# Patient Record
Sex: MALE | Race: BLACK OR AFRICAN AMERICAN | NOT HISPANIC OR LATINO | Employment: FULL TIME | URBAN - METROPOLITAN AREA
[De-identification: names, ages, dates, MRNs, and addresses within clinical notes are randomized per-mention and may not be internally consistent; named-entity substitution may affect disease eponyms.]

---

## 2020-09-20 ENCOUNTER — HOSPITAL ENCOUNTER (EMERGENCY)
Facility: HOSPITAL | Age: 25
Discharge: HOME | End: 2020-09-20
Attending: EMERGENCY MEDICINE
Payer: MEDICAID

## 2020-09-20 VITALS
WEIGHT: 150 LBS | OXYGEN SATURATION: 97 % | HEART RATE: 116 BPM | BODY MASS INDEX: 21.47 KG/M2 | DIASTOLIC BLOOD PRESSURE: 74 MMHG | HEIGHT: 70 IN | RESPIRATION RATE: 20 BRPM | SYSTOLIC BLOOD PRESSURE: 146 MMHG | TEMPERATURE: 98 F

## 2020-09-20 DIAGNOSIS — J45.901 MODERATE ASTHMA WITH EXACERBATION, UNSPECIFIED WHETHER PERSISTENT: Primary | ICD-10-CM

## 2020-09-20 DIAGNOSIS — R06.02 SOB (SHORTNESS OF BREATH): ICD-10-CM

## 2020-09-20 LAB
ALBUMIN SERPL BCP-MCNC: 4.3 G/DL (ref 3.5–5.2)
ALP SERPL-CCNC: 53 U/L (ref 55–135)
ALT SERPL W/O P-5'-P-CCNC: 13 U/L (ref 10–44)
ANION GAP SERPL CALC-SCNC: 14 MMOL/L (ref 8–16)
AST SERPL-CCNC: 31 U/L (ref 10–40)
BASOPHILS # BLD AUTO: 0.08 K/UL (ref 0–0.2)
BASOPHILS NFR BLD: 0.7 % (ref 0–1.9)
BILIRUB SERPL-MCNC: 0.5 MG/DL (ref 0.1–1)
BUN SERPL-MCNC: 12 MG/DL (ref 6–20)
CALCIUM SERPL-MCNC: 9.4 MG/DL (ref 8.7–10.5)
CHLORIDE SERPL-SCNC: 104 MMOL/L (ref 95–110)
CO2 SERPL-SCNC: 21 MMOL/L (ref 23–29)
CREAT SERPL-MCNC: 1 MG/DL (ref 0.5–1.4)
DIFFERENTIAL METHOD: ABNORMAL
EOSINOPHIL # BLD AUTO: 1.5 K/UL (ref 0–0.5)
EOSINOPHIL NFR BLD: 13.7 % (ref 0–8)
ERYTHROCYTE [DISTWIDTH] IN BLOOD BY AUTOMATED COUNT: 12.6 % (ref 11.5–14.5)
EST. GFR  (AFRICAN AMERICAN): >60 ML/MIN/1.73 M^2
EST. GFR  (NON AFRICAN AMERICAN): >60 ML/MIN/1.73 M^2
GLUCOSE SERPL-MCNC: 84 MG/DL (ref 70–110)
HCT VFR BLD AUTO: 42.5 % (ref 40–54)
HGB BLD-MCNC: 13.8 G/DL (ref 14–18)
IMM GRANULOCYTES # BLD AUTO: 0.04 K/UL (ref 0–0.04)
IMM GRANULOCYTES NFR BLD AUTO: 0.4 % (ref 0–0.5)
LYMPHOCYTES # BLD AUTO: 2 K/UL (ref 1–4.8)
LYMPHOCYTES NFR BLD: 18.8 % (ref 18–48)
MCH RBC QN AUTO: 29.3 PG (ref 27–31)
MCHC RBC AUTO-ENTMCNC: 32.5 G/DL (ref 32–36)
MCV RBC AUTO: 90 FL (ref 82–98)
MONOCYTES # BLD AUTO: 0.6 K/UL (ref 0.3–1)
MONOCYTES NFR BLD: 5.6 % (ref 4–15)
NEUTROPHILS # BLD AUTO: 6.6 K/UL (ref 1.8–7.7)
NEUTROPHILS NFR BLD: 60.8 % (ref 38–73)
NRBC BLD-RTO: 0 /100 WBC
PLATELET # BLD AUTO: 157 K/UL (ref 150–350)
PMV BLD AUTO: 12.3 FL (ref 9.2–12.9)
POTASSIUM SERPL-SCNC: 3.8 MMOL/L (ref 3.5–5.1)
PROT SERPL-MCNC: 8.1 G/DL (ref 6–8.4)
RBC # BLD AUTO: 4.71 M/UL (ref 4.6–6.2)
SARS-COV-2 RDRP RESP QL NAA+PROBE: NEGATIVE
SODIUM SERPL-SCNC: 139 MMOL/L (ref 136–145)
WBC # BLD AUTO: 10.88 K/UL (ref 3.9–12.7)

## 2020-09-20 PROCEDURE — 99900035 HC TECH TIME PER 15 MIN (STAT)

## 2020-09-20 PROCEDURE — 93010 EKG 12-LEAD: ICD-10-PCS | Mod: ,,, | Performed by: INTERNAL MEDICINE

## 2020-09-20 PROCEDURE — 25000003 PHARM REV CODE 250: Performed by: NURSE PRACTITIONER

## 2020-09-20 PROCEDURE — 99285 PR EMERGENCY DEPT VISIT,LEVEL V: ICD-10-PCS | Mod: ,,, | Performed by: NURSE PRACTITIONER

## 2020-09-20 PROCEDURE — 27000221 HC OXYGEN, UP TO 24 HOURS

## 2020-09-20 PROCEDURE — 94761 N-INVAS EAR/PLS OXIMETRY MLT: CPT

## 2020-09-20 PROCEDURE — U0002 COVID-19 LAB TEST NON-CDC: HCPCS

## 2020-09-20 PROCEDURE — 96360 HYDRATION IV INFUSION INIT: CPT

## 2020-09-20 PROCEDURE — 99285 EMERGENCY DEPT VISIT HI MDM: CPT | Mod: ,,, | Performed by: NURSE PRACTITIONER

## 2020-09-20 PROCEDURE — 99285 EMERGENCY DEPT VISIT HI MDM: CPT | Mod: 25

## 2020-09-20 PROCEDURE — 93010 ELECTROCARDIOGRAM REPORT: CPT | Mod: ,,, | Performed by: INTERNAL MEDICINE

## 2020-09-20 PROCEDURE — 25000242 PHARM REV CODE 250 ALT 637 W/ HCPCS: Performed by: NURSE PRACTITIONER

## 2020-09-20 PROCEDURE — 93005 ELECTROCARDIOGRAM TRACING: CPT

## 2020-09-20 PROCEDURE — 80053 COMPREHEN METABOLIC PANEL: CPT

## 2020-09-20 PROCEDURE — 94640 AIRWAY INHALATION TREATMENT: CPT

## 2020-09-20 PROCEDURE — 85025 COMPLETE CBC W/AUTO DIFF WBC: CPT

## 2020-09-20 RX ORDER — ALBUTEROL SULFATE 90 UG/1
1-2 AEROSOL, METERED RESPIRATORY (INHALATION) EVERY 6 HOURS PRN
Qty: 18 G | Refills: 2 | Status: ON HOLD | OUTPATIENT
Start: 2020-09-20 | End: 2020-11-01 | Stop reason: HOSPADM

## 2020-09-20 RX ORDER — ALBUTEROL SULFATE 90 UG/1
1-2 AEROSOL, METERED RESPIRATORY (INHALATION) EVERY 6 HOURS PRN
Qty: 18 G | Refills: 2 | OUTPATIENT
Start: 2020-09-20 | End: 2020-09-20 | Stop reason: SDUPTHER

## 2020-09-20 RX ORDER — PREDNISONE 50 MG/1
50 TABLET ORAL DAILY
Qty: 5 TABLET | Refills: 0 | Status: SHIPPED | OUTPATIENT
Start: 2020-09-20 | End: 2020-09-25

## 2020-09-20 RX ORDER — IPRATROPIUM BROMIDE AND ALBUTEROL SULFATE 2.5; .5 MG/3ML; MG/3ML
3 SOLUTION RESPIRATORY (INHALATION)
Status: COMPLETED | OUTPATIENT
Start: 2020-09-20 | End: 2020-09-20

## 2020-09-20 RX ORDER — PREDNISONE 50 MG/1
50 TABLET ORAL DAILY
Qty: 5 TABLET | Refills: 0 | OUTPATIENT
Start: 2020-09-20 | End: 2020-09-20 | Stop reason: SDUPTHER

## 2020-09-20 RX ADMIN — IPRATROPIUM BROMIDE AND ALBUTEROL SULFATE 3 ML: .5; 2.5 SOLUTION RESPIRATORY (INHALATION) at 03:09

## 2020-09-20 RX ADMIN — SODIUM CHLORIDE 1000 ML: 0.9 INJECTION, SOLUTION INTRAVENOUS at 03:09

## 2020-09-20 NOTE — ED TRIAGE NOTES
Pt coming in for SOB and chest pain. Hx of asthma, stated he has been having more asthma attacks this week and has ran out of his inhaler. Pt barely able to talk due to SOB. Was given 125 of solumedrol and albuterol by EMS prior to arrival    LOC: The patient is awake, alert, and oriented to place, time, situation. Affect is appropriate.  Speech is appropriate and clear.     APPEARANCE: Pt shaking and having trouble breathing. Patient is clean and well groomed.    SKIN: The skin is warm and dry; color consistent with ethnicity.  Patient has normal skin turgor and moist mucus membranes.  Skin intact; no breakdown or bruising noted.     MUSCULOSKELETAL: Patient moving upper and lower extremities without difficulty.  Denies weakness.     RESPIRATORY: Airway is open and patent. Respirations spontaneous, even and labored. SOB and wheezing noted. . Accessory muscle use noted. Denies cough.     CARDIAC:  Sinus tachycardia.  No peripheral edema noted. Complaints of chest pain.      ABDOMEN: Soft and non tender to palpation.  No distention noted.     NEUROLOGIC: Eyes open spontaneously.  Behavior appropriate to situation.  Follows commands; facial expression symmetrical.  Purposeful motor response noted; normal sensation in all extremities.

## 2020-09-20 NOTE — ED PROVIDER NOTES
Encounter Date: 9/20/2020       History     Chief Complaint   Patient presents with    Shortness of Breath     arrived via  EMS with c/o SOB, hx of asthma, out of inhaler, reports more frequent asthma attacks x1 week, initial SpO2 80% on RA, wheezing, speaking short sentences, improved now, c/o continued CP and SOB, still speaking short sentences, received albuteral atrovent and 125mg solumedrol by ems     Patient is a 25-year-old male with medical history of asthma presenting to the ED for increased shortness of breath and wheezing for the past few days.  Patient states he recently ran out of his inhalers.  Patient states this afternoon he had increased wheezing and could only speak in 1 word sentences.  Patient activated EMS and on arrival patient's oxygen saturations at or 80%.  Patient was given inhalers and Solu-Medrol and route to the ED. Patient is able to speak in 3 to 4 word sentences.  Patient denies any recent fever, chills or sick contacts.    The history is provided by the patient.     Review of patient's allergies indicates:  No Known Allergies  Past Medical History:   Diagnosis Date    Asthma      History reviewed. No pertinent surgical history.  History reviewed. No pertinent family history.  Social History     Tobacco Use    Smoking status: Never Smoker   Substance Use Topics    Alcohol use: Not on file    Drug use: Not on file     Review of Systems   Constitutional: Negative for activity change, appetite change, chills, fatigue and fever.   HENT: Negative for congestion and sore throat.    Eyes: Negative for photophobia and visual disturbance.   Respiratory: Positive for cough, chest tightness, shortness of breath and wheezing.    Cardiovascular: Negative for chest pain, palpitations and leg swelling.   Gastrointestinal: Negative for abdominal pain, constipation, diarrhea, nausea and vomiting.   Genitourinary: Negative for dysuria.   Musculoskeletal: Negative for arthralgias, back pain and  myalgias.   Skin: Negative for rash.   Allergic/Immunologic: Negative for immunocompromised state.   Neurological: Negative for dizziness, weakness, numbness and headaches.   Hematological: Does not bruise/bleed easily.   All other systems reviewed and are negative.      Physical Exam     Initial Vitals [09/20/20 1501]   BP Pulse Resp Temp SpO2   (!) 130/91 (!) 115 (!) 23 98.4 °F (36.9 °C) 99 %      MAP       --         Physical Exam    Nursing note and vitals reviewed.  Constitutional: Vital signs are normal. He appears well-developed and well-nourished. He is cooperative. No distress.   HENT:   Head: Normocephalic and atraumatic.   Mouth/Throat: Mucous membranes are normal.   Eyes: EOM and lids are normal. Pupils are equal, round, and reactive to light.   Neck: Trachea normal, normal range of motion and phonation normal. Neck supple.   Cardiovascular: Normal rate, regular rhythm and intact distal pulses.   Pulses:       Radial pulses are 2+ on the right side and 2+ on the left side.   Pulmonary/Chest: Effort normal. He has decreased breath sounds. He has wheezes in the right lower field and the left lower field. He has no rhonchi. He has no rales.   Abdominal: Normal appearance.   Musculoskeletal: Normal range of motion.   Neurological: He is alert and oriented to person, place, and time. He has normal strength. No sensory deficit. He displays a negative Romberg sign. GCS eye subscore is 4. GCS verbal subscore is 5. GCS motor subscore is 6.   Skin: Skin is warm, dry and intact. Capillary refill takes 2 to 3 seconds. No abrasion, no laceration and no rash noted. No cyanosis. Nails show no clubbing.         ED Course   Procedures  Labs Reviewed   CBC W/ AUTO DIFFERENTIAL - Abnormal; Notable for the following components:       Result Value    Hemoglobin 13.8 (*)     Eos # 1.5 (*)     Eosinophil% 13.7 (*)     All other components within normal limits   COMPREHENSIVE METABOLIC PANEL - Abnormal; Notable for the  following components:    CO2 21 (*)     Alkaline Phosphatase 53 (*)     All other components within normal limits   SARS-COV-2 RNA AMPLIFICATION, QUAL     EKG Readings: (Independently Interpreted)   Rhythm: Junctional Tachycardia. Heart Rate: 123.   My interpretation       Imaging Results          X-Ray Chest AP Portable (Final result)  Result time 09/20/20 15:45:10    Final result by Sharon Allen MD (09/20/20 15:45:10)                 Impression:      No acute abnormality.      Electronically signed by: Sharon Allen MD  Date:    09/20/2020  Time:    15:45             Narrative:    EXAMINATION:  XR CHEST AP PORTABLE    CLINICAL HISTORY:  Shortness of breath    TECHNIQUE:  Single frontal view of the chest was performed.    COMPARISON:  None    FINDINGS:  The lungs are clear, with normal appearance of pulmonary vasculature and no large pleural effusion or pneumothorax.    The cardiac silhouette is normal in size. The hilar and mediastinal contours are unremarkable.    Bones are intact.                              X-Rays:   Independently Interpreted Readings:   Other Readings:  Reviewed by me, read by Radiology    Medical Decision Making:   Initial Assessment:   Emergent evaluation of a 25-year-old male presenting to the ED for increased shortness of breath and wheezing.  Patient recently ran out of his albuterol inhaler.  On exam patient is A&O x3.  Not febrile and nontoxic appearing.  Breath sounds diminished with wheezes noted in the bases on exam.  No tachypnea.  Patient is speaking in full sentences.  Abdomen soft and nontender.  Bowel sounds within normal limits.  Strength 5/5 in all extremities.  Differential Diagnosis:   Differential diagnoses include but are not limited to asthma exacerbation, COVID, viral URI, pneumonia, bronchitis.      Clinical Tests:   Lab Tests: Ordered and Reviewed  The following lab test(s) were unremarkable: CBC and CMP  Radiological Study: Ordered and Reviewed  Medical  Tests: Reviewed and Ordered  ED Management:  I will get basic labs, imaging, duo nebs and reassess.  I discussed this case with my supervising physician.                   ED Course as of Sep 20 1630   Sun Sep 20, 2020   1600 Patient's CBC unremarkable.  No leukocytosis noted.  H&H stable.  CMP unremarkable year COVID negative.  Chest x-ray negative for any acute abnormalities.  Patient reassessed patient no longer wheezing.  Patient is speaking in full sentences.  Patient updated on lab and imaging results.  Advised we will give prednisone for 5 days.  Refilled albuterol inhaler.  Advised increase fluids and rest.  Patient verbalized understanding of this plan of care.  All questions and concerns addressed.    [RZ]   1615 Patient is hemodynamically stable, vital signs are normal. Discharge instructions given. Return to ED precautions discussed. Follow up as directed. Pt verbalized understanding of this plan.  Pt is stable for discharge.     [RZ]      ED Course User Index  [RZ] Vandana Leal NP            Clinical Impression:       ICD-10-CM ICD-9-CM   1. Moderate asthma with exacerbation, unspecified whether persistent  J45.901 493.92   2. SOB (shortness of breath)  R06.02 786.05                      Disposition:   Disposition: Discharged  Condition: Stable     ED Disposition Condition    Discharge Stable        ED Prescriptions     Medication Sig Dispense Start Date End Date Auth. Provider    albuterol (PROVENTIL/VENTOLIN HFA) 90 mcg/actuation inhaler  (Status: Discontinued) Inhale 1-2 puffs into the lungs every 6 (six) hours as needed for Wheezing. Rescue 18 g 9/20/2020 9/20/2020 Vandana Leal NP    predniSONE (DELTASONE) 50 MG Tab  (Status: Discontinued) Take 1 tablet (50 mg total) by mouth once daily. for 5 days 5 tablet 9/20/2020 9/20/2020 Vandana Leal NP    albuterol (PROVENTIL/VENTOLIN HFA) 90 mcg/actuation inhaler  (Status: Discontinued) Inhale 1-2 puffs into the lungs every 6 (six) hours as  needed for Wheezing. Rescue 18 g 9/20/2020 9/20/2020 Vandana Leal NP    albuterol (PROVENTIL/VENTOLIN HFA) 90 mcg/actuation inhaler Inhale 1-2 puffs into the lungs every 6 (six) hours as needed for Wheezing. Rescue 18 g 9/20/2020 9/20/2021 Radha Pascal MD    predniSONE (DELTASONE) 50 MG Tab Take 1 tablet (50 mg total) by mouth once daily. for 5 days 5 tablet 9/20/2020 9/25/2020 Radha Pascal MD        Follow-up Information     Follow up With Specialties Details Why Contact Info    [facility], Family Medicine Schedule an appointment as soon as possible for a visit  As needed [address] [facility] [address] [facility] [address]  [phone]      [facility]  Schedule an appointment as soon as possible for a visit  As needed [address] [facility] [address]  [phone]                                         Vandana Leal NP  09/20/20 8463

## 2020-09-20 NOTE — DISCHARGE INSTRUCTIONS
We have prescribed you albuterol and prednisone for your asthma exacerbation.  Please increase fluids and rest today.  Follow-up with your PCP as needed.

## 2020-10-31 ENCOUNTER — HOSPITAL ENCOUNTER (OUTPATIENT)
Facility: HOSPITAL | Age: 25
Discharge: HOME | DRG: 202 | End: 2020-11-01
Attending: EMERGENCY MEDICINE | Admitting: EMERGENCY MEDICINE
Payer: MEDICAID

## 2020-10-31 DIAGNOSIS — J45.21 MILD INTERMITTENT ASTHMA WITH EXACERBATION: Primary | ICD-10-CM

## 2020-10-31 PROBLEM — E87.6 HYPOKALEMIA: Status: ACTIVE | Noted: 2020-10-31

## 2020-10-31 PROBLEM — Z75.8 DISCHARGE PLANNING ISSUES: Status: ACTIVE | Noted: 2020-10-31

## 2020-10-31 PROBLEM — J45.31 MILD PERSISTENT ASTHMA WITH ACUTE EXACERBATION: Status: ACTIVE | Noted: 2020-10-31

## 2020-10-31 PROBLEM — E87.29 HIGH ANION GAP METABOLIC ACIDOSIS: Status: ACTIVE | Noted: 2020-10-31

## 2020-10-31 LAB
ALBUMIN SERPL BCP-MCNC: 4.3 G/DL (ref 3.5–5.2)
ALP SERPL-CCNC: 50 U/L (ref 55–135)
ALT SERPL W/O P-5'-P-CCNC: 13 U/L (ref 10–44)
ANION GAP SERPL CALC-SCNC: 14 MMOL/L (ref 8–16)
AST SERPL-CCNC: 19 U/L (ref 10–40)
BASOPHILS # BLD AUTO: 0.06 K/UL (ref 0–0.2)
BASOPHILS NFR BLD: 0.4 % (ref 0–1.9)
BILIRUB SERPL-MCNC: 0.7 MG/DL (ref 0.1–1)
BUN SERPL-MCNC: 13 MG/DL (ref 6–20)
CALCIUM SERPL-MCNC: 9.1 MG/DL (ref 8.7–10.5)
CHLORIDE SERPL-SCNC: 99 MMOL/L (ref 95–110)
CO2 SERPL-SCNC: 23 MMOL/L (ref 23–29)
CREAT SERPL-MCNC: 1 MG/DL (ref 0.5–1.4)
CTP QC/QA: YES
DIFFERENTIAL METHOD: ABNORMAL
EOSINOPHIL # BLD AUTO: 0.3 K/UL (ref 0–0.5)
EOSINOPHIL NFR BLD: 1.9 % (ref 0–8)
ERYTHROCYTE [DISTWIDTH] IN BLOOD BY AUTOMATED COUNT: 13 % (ref 11.5–14.5)
EST. GFR  (AFRICAN AMERICAN): >60 ML/MIN/1.73 M^2
EST. GFR  (NON AFRICAN AMERICAN): >60 ML/MIN/1.73 M^2
GLUCOSE SERPL-MCNC: 100 MG/DL (ref 70–110)
HCT VFR BLD AUTO: 42 % (ref 40–54)
HGB BLD-MCNC: 13.8 G/DL (ref 14–18)
IMM GRANULOCYTES # BLD AUTO: 0.05 K/UL (ref 0–0.04)
IMM GRANULOCYTES NFR BLD AUTO: 0.4 % (ref 0–0.5)
LACTATE SERPL-SCNC: 3.3 MMOL/L (ref 0.5–2.2)
LYMPHOCYTES # BLD AUTO: 1.3 K/UL (ref 1–4.8)
LYMPHOCYTES NFR BLD: 9.4 % (ref 18–48)
MAGNESIUM SERPL-MCNC: 1.9 MG/DL (ref 1.6–2.6)
MCH RBC QN AUTO: 29.6 PG (ref 27–31)
MCHC RBC AUTO-ENTMCNC: 32.9 G/DL (ref 32–36)
MCV RBC AUTO: 90 FL (ref 82–98)
MONOCYTES # BLD AUTO: 0.5 K/UL (ref 0.3–1)
MONOCYTES NFR BLD: 3.8 % (ref 4–15)
NEUTROPHILS # BLD AUTO: 11.5 K/UL (ref 1.8–7.7)
NEUTROPHILS NFR BLD: 84.1 % (ref 38–73)
NRBC BLD-RTO: 0 /100 WBC
PHOSPHATE SERPL-MCNC: 3.2 MG/DL (ref 2.7–4.5)
PLATELET # BLD AUTO: 215 K/UL (ref 150–350)
PMV BLD AUTO: 10.9 FL (ref 9.2–12.9)
POTASSIUM SERPL-SCNC: 3.3 MMOL/L (ref 3.5–5.1)
PROT SERPL-MCNC: 7.4 G/DL (ref 6–8.4)
RBC # BLD AUTO: 4.66 M/UL (ref 4.6–6.2)
SARS-COV-2 RDRP RESP QL NAA+PROBE: NEGATIVE
SODIUM SERPL-SCNC: 136 MMOL/L (ref 136–145)
WBC # BLD AUTO: 13.67 K/UL (ref 3.9–12.7)

## 2020-10-31 PROCEDURE — 99223 PR INITIAL HOSPITAL CARE,LEVL III: ICD-10-PCS | Mod: ,,, | Performed by: STUDENT IN AN ORGANIZED HEALTH CARE EDUCATION/TRAINING PROGRAM

## 2020-10-31 PROCEDURE — 27000221 HC OXYGEN, UP TO 24 HOURS

## 2020-10-31 PROCEDURE — U0002 COVID-19 LAB TEST NON-CDC: HCPCS | Performed by: STUDENT IN AN ORGANIZED HEALTH CARE EDUCATION/TRAINING PROGRAM

## 2020-10-31 PROCEDURE — 96361 HYDRATE IV INFUSION ADD-ON: CPT

## 2020-10-31 PROCEDURE — 99291 CRITICAL CARE FIRST HOUR: CPT

## 2020-10-31 PROCEDURE — 84100 ASSAY OF PHOSPHORUS: CPT

## 2020-10-31 PROCEDURE — 25000242 PHARM REV CODE 250 ALT 637 W/ HCPCS: Performed by: STUDENT IN AN ORGANIZED HEALTH CARE EDUCATION/TRAINING PROGRAM

## 2020-10-31 PROCEDURE — G0378 HOSPITAL OBSERVATION PER HR: HCPCS

## 2020-10-31 PROCEDURE — 99291 CRITICAL CARE FIRST HOUR: CPT | Mod: ,,, | Performed by: EMERGENCY MEDICINE

## 2020-10-31 PROCEDURE — 63600175 PHARM REV CODE 636 W HCPCS: Performed by: STUDENT IN AN ORGANIZED HEALTH CARE EDUCATION/TRAINING PROGRAM

## 2020-10-31 PROCEDURE — 80053 COMPREHEN METABOLIC PANEL: CPT

## 2020-10-31 PROCEDURE — 94640 AIRWAY INHALATION TREATMENT: CPT

## 2020-10-31 PROCEDURE — 83605 ASSAY OF LACTIC ACID: CPT

## 2020-10-31 PROCEDURE — 83735 ASSAY OF MAGNESIUM: CPT

## 2020-10-31 PROCEDURE — 99223 1ST HOSP IP/OBS HIGH 75: CPT | Mod: ,,, | Performed by: STUDENT IN AN ORGANIZED HEALTH CARE EDUCATION/TRAINING PROGRAM

## 2020-10-31 PROCEDURE — 85025 COMPLETE CBC W/AUTO DIFF WBC: CPT

## 2020-10-31 PROCEDURE — 25000003 PHARM REV CODE 250: Performed by: EMERGENCY MEDICINE

## 2020-10-31 PROCEDURE — 94660 CPAP INITIATION&MGMT: CPT

## 2020-10-31 PROCEDURE — 27000190 HC CPAP FULL FACE MASK W/VALVE

## 2020-10-31 PROCEDURE — 99291 PR CRITICAL CARE, E/M 30-74 MINUTES: ICD-10-PCS | Mod: ,,, | Performed by: EMERGENCY MEDICINE

## 2020-10-31 PROCEDURE — 99900035 HC TECH TIME PER 15 MIN (STAT)

## 2020-10-31 PROCEDURE — 99285 EMERGENCY DEPT VISIT HI MDM: CPT | Mod: 25

## 2020-10-31 PROCEDURE — 94761 N-INVAS EAR/PLS OXIMETRY MLT: CPT

## 2020-10-31 PROCEDURE — 11000001 HC ACUTE MED/SURG PRIVATE ROOM

## 2020-10-31 PROCEDURE — 25000242 PHARM REV CODE 250 ALT 637 W/ HCPCS

## 2020-10-31 RX ORDER — IBUPROFEN 200 MG
24 TABLET ORAL
Status: DISCONTINUED | OUTPATIENT
Start: 2020-10-31 | End: 2020-11-01 | Stop reason: HOSPADM

## 2020-10-31 RX ORDER — ALBUTEROL SULFATE 90 UG/1
2 AEROSOL, METERED RESPIRATORY (INHALATION) EVERY 4 HOURS PRN
Status: DISCONTINUED | OUTPATIENT
Start: 2020-10-31 | End: 2020-11-01 | Stop reason: HOSPADM

## 2020-10-31 RX ORDER — SODIUM CHLORIDE 0.9 % (FLUSH) 0.9 %
10 SYRINGE (ML) INJECTION
Status: DISCONTINUED | OUTPATIENT
Start: 2020-10-31 | End: 2020-11-01 | Stop reason: HOSPADM

## 2020-10-31 RX ORDER — ALBUTEROL SULFATE 2.5 MG/.5ML
2.5 SOLUTION RESPIRATORY (INHALATION)
Status: DISCONTINUED | OUTPATIENT
Start: 2020-10-31 | End: 2020-11-01 | Stop reason: HOSPADM

## 2020-10-31 RX ORDER — IBUPROFEN 200 MG
16 TABLET ORAL
Status: DISCONTINUED | OUTPATIENT
Start: 2020-10-31 | End: 2020-11-01 | Stop reason: HOSPADM

## 2020-10-31 RX ORDER — FLUTICASONE FUROATE AND VILANTEROL 100; 25 UG/1; UG/1
1 POWDER RESPIRATORY (INHALATION) DAILY
Status: DISCONTINUED | OUTPATIENT
Start: 2020-10-31 | End: 2020-11-01 | Stop reason: HOSPADM

## 2020-10-31 RX ORDER — POLYETHYLENE GLYCOL 3350 17 G/17G
17 POWDER, FOR SOLUTION ORAL DAILY PRN
Status: DISCONTINUED | OUTPATIENT
Start: 2020-10-31 | End: 2020-11-01 | Stop reason: HOSPADM

## 2020-10-31 RX ORDER — GLUCAGON 1 MG
1 KIT INJECTION
Status: DISCONTINUED | OUTPATIENT
Start: 2020-10-31 | End: 2020-11-01 | Stop reason: HOSPADM

## 2020-10-31 RX ORDER — ONDANSETRON 2 MG/ML
4 INJECTION INTRAMUSCULAR; INTRAVENOUS EVERY 8 HOURS PRN
Status: DISCONTINUED | OUTPATIENT
Start: 2020-10-31 | End: 2020-11-01 | Stop reason: HOSPADM

## 2020-10-31 RX ORDER — TALC
6 POWDER (GRAM) TOPICAL NIGHTLY PRN
Status: DISCONTINUED | OUTPATIENT
Start: 2020-10-31 | End: 2020-11-01 | Stop reason: HOSPADM

## 2020-10-31 RX ORDER — PREDNISONE 10 MG/1
40 TABLET ORAL DAILY
Status: DISCONTINUED | OUTPATIENT
Start: 2020-10-31 | End: 2020-11-01 | Stop reason: HOSPADM

## 2020-10-31 RX ORDER — ACETAMINOPHEN 500 MG
1000 TABLET ORAL EVERY 8 HOURS PRN
Status: DISCONTINUED | OUTPATIENT
Start: 2020-10-31 | End: 2020-11-01 | Stop reason: HOSPADM

## 2020-10-31 RX ORDER — ALBUTEROL SULFATE 2.5 MG/.5ML
2.5 SOLUTION RESPIRATORY (INHALATION)
Status: COMPLETED | OUTPATIENT
Start: 2020-10-31 | End: 2020-10-31

## 2020-10-31 RX ORDER — ALBUTEROL SULFATE 2.5 MG/.5ML
SOLUTION RESPIRATORY (INHALATION)
Status: COMPLETED
Start: 2020-10-31 | End: 2020-10-31

## 2020-10-31 RX ADMIN — ALBUTEROL SULFATE 2.5 MG: 2.5 SOLUTION RESPIRATORY (INHALATION) at 08:10

## 2020-10-31 RX ADMIN — ALBUTEROL SULFATE 15 MG: 2.5 SOLUTION RESPIRATORY (INHALATION) at 05:10

## 2020-10-31 RX ADMIN — PREDNISONE 40 MG: 20 TABLET ORAL at 08:10

## 2020-10-31 RX ADMIN — FLUTICASONE FUROATE AND VILANTEROL TRIFENATATE 1 PUFF: 100; 25 POWDER RESPIRATORY (INHALATION) at 08:10

## 2020-10-31 RX ADMIN — SODIUM CHLORIDE 1000 ML: 0.9 INJECTION, SOLUTION INTRAVENOUS at 06:10

## 2020-10-31 NOTE — ED NOTES
Pt identifiers checked and accurate with Mona Whyte     Pt from work via EMS for asthma attack, original room air saturation in 70's, pt arrives to ED on CPAP, EMS administered albuterol treatment, solumedrol and 2 g magnesium. Pt hx of asthma, using inhaler at home more frequently recently with cough. Pt denies CP, fever, chills, abdominal pain.     LOC: The patient is awake, alert and aware of environment with an appropriate affect, the patient is oriented x 3 and speaking appropriately.  APPEARANCE: Patient resting comfortably and in no acute distress, patient is clean and well groomed  SKIN: The skin is warm and dry, color consistent with ethnicity, patient has normal skin turgor and moist mucus membranes, skin intact, no breakdown or brusing noted.  MUSCULOSKELETAL: Patient moving all extremities well, no obvious swelling or deformities noted.   RESPIRATORY: Airway is open and patent; respirations are spontaneous, patient has increased effort and rate of breathing, pt arrived to ED on CPAP, oxygen saturation 100%. Once removed from CPAP, pt oxygen saturation decreased to 82% on room air.   CARDIAC: Patient has no peripheral edema noted. No complaints of chest pain at this time.   ABDOMEN: Soft and non tender to palpation, no distention noted. Bowel sounds present x 4  NEUROLOGIC: eyes open spontaneously, behavior appropriate to situation, follows commands, facial expression symmetrical, purposeful motor response noted, normal sensation in all extremities when touched with a finger.        RT, ED MD at the bedside.

## 2020-10-31 NOTE — ED NOTES
Pt provided call bell and instructed to call for assistance as needed, pt repositioned and states no needs at this time. Will continue to monitor and update pt on plan of care.

## 2020-10-31 NOTE — ED PROVIDER NOTES
Encounter Date: 10/31/2020       History     Chief Complaint   Patient presents with    Asthma     26yo M with asthma presents on CPAP via EMS for shortness of breath. He says that his asthma is usually controlled and he only uses a rescue inhaler, though he was in the ED within the past month. He has never been intubated or required ICU for asthma. He states that his usual trigger is the weather this time of year. He received magnesium, albuterol via EMS prior to arrival.   EMS reports significant distress prior to arrival. They noted no air movement and an initial sat of 78%      The history is provided by the patient and the EMS personnel. The history is limited by the condition of the patient and the absence of a caregiver.     Review of patient's allergies indicates:   Allergen Reactions    Atrovent [ipratropium bromide]     Nuts [tree nut]     Shellfish containing products      Past Medical History:   Diagnosis Date    Asthma     Mild Persistent    Stutter      Past Surgical History:   Procedure Laterality Date    MANDIBLE SURGERY       Family History   Problem Relation Age of Onset    No Known Problems Mother     No Known Problems Father      Social History     Tobacco Use    Smoking status: Never Smoker   Substance Use Topics    Alcohol use: Not on file    Drug use: Not on file     Review of Systems   Unable to perform ROS: Severe respiratory distress       Physical Exam     Initial Vitals   BP Pulse Resp Temp SpO2   10/31/20 1727 10/31/20 1727 10/31/20 1727 10/31/20 1906 10/31/20 1727   126/78 (!) 120 (!) 34 98 °F (36.7 °C) 98 %      MAP       --                Physical Exam    Nursing note and vitals reviewed.  Constitutional: He appears distressed.   Patient in acute distress, with bipap on, only able to speak in 1-2 words phrases, no tripoding but accessory muscle use, alert   HENT:   Head: Normocephalic and atraumatic.   Mouth/Throat: Oropharynx is clear and moist.   Eyes: Conjunctivae are  normal. No scleral icterus.   Neck: Normal range of motion. Neck supple.   Cardiovascular: Regular rhythm, normal heart sounds and intact distal pulses.   Tachycardia   Pulmonary/Chest: He is in respiratory distress.   Decreased air movement in the lung bases, inspiratory and expiratory wheezes in mid and upper lung zones bilaterally. No rales. Patient with difficulty speaking 1-2 words   Abdominal: Soft. He exhibits no distension. There is no abdominal tenderness.   Musculoskeletal: No tenderness or edema.   Lymphadenopathy:     He has no cervical adenopathy.   Neurological: He is alert and oriented to person, place, and time.   Skin: Skin is warm and dry. Capillary refill takes less than 2 seconds. No rash noted.   Psychiatric: He has a normal mood and affect. Thought content normal.         ED Course   Procedures  Labs Reviewed   CBC W/ AUTO DIFFERENTIAL - Abnormal; Notable for the following components:       Result Value    WBC 13.67 (*)     Hemoglobin 13.8 (*)     Gran # (ANC) 11.5 (*)     Immature Grans (Abs) 0.05 (*)     Gran % 84.1 (*)     Lymph % 9.4 (*)     Mono % 3.8 (*)     All other components within normal limits   COMPREHENSIVE METABOLIC PANEL - Abnormal; Notable for the following components:    Potassium 3.3 (*)     Alkaline Phosphatase 50 (*)     All other components within normal limits   CULTURE, RESPIRATORY   MAGNESIUM   PHOSPHORUS   SARS-COV-2 RDRP GENE          Imaging Results          X-Ray Chest AP Portable (Final result)  Result time 10/31/20 18:44:54    Final result by Yosef Calvin MD (10/31/20 18:44:54)                 Impression:      1. No acute cardiopulmonary process.      Electronically signed by: Yosef Calvin MD  Date:    10/31/2020  Time:    18:44             Narrative:    EXAMINATION:  XR CHEST AP PORTABLE    CLINICAL HISTORY:  Asthma;    TECHNIQUE:  Single frontal view of the chest was performed.    COMPARISON:  09/20/2020    FINDINGS:  The cardiomediastinal silhouette is  not enlarged.  There is no pleural effusion.  The trachea is midline.  The lungs are symmetrically expanded bilaterally with minimally coarse interstitial attenuation.  No large focal consolidation seen.  There is no pneumothorax.  The osseous structures are unremarkable.                              X-Rays:   Independently Interpreted Readings:   Chest X-Ray: Normal heart size.  No infiltrates.  No acute abnormalities.     Medical Decision Making:   Initial Assessment:   24yo M with asthma presents via EMS for shortness of breath requiring magnesium, albuterol, and steroids. He presents in acute respiratory distress but with 100% oxygen saturation on CPAP  Differential Diagnosis:   Asthma exacerbation, URI, pneumonia, COVID  Independently Interpreted Test(s):   I have ordered and independently interpreted X-rays - see prior notes.  Clinical Tests:   Lab Tests: Ordered and Reviewed  Radiological Study: Ordered and Reviewed  Medical Tests: Ordered and Reviewed  ED Management:  Albuterol treatments given. Patient transitioned to Bipap on arrival  Chest xray shows hyperinflated lungs but no consolidation or diffuse opacities. Less likely COVID or pneumonia  After being weaned off bipap, patient is stable but with recurrence of decreased air movement on auscultation. As this is the second ED presentation in a month and due to severity of exacerbation with continuing wheezing and poor air movement, will admit to hospital medicine.  Other:   I have discussed this case with another health care provider.              Attending Attestation:   Physician Attestation Statement for Resident:  As the supervising MD   Physician Attestation Statement: I have personally seen and examined this patient.   I agree with the above history. -:   As the supervising MD I agree with the above PE.    As the supervising MD I agree with the above treatment, course, plan, and disposition.        Attending Critical Care:   Critical Care Times:    Direct Patient Care (initial evaluation, reassessments, and time considering the case)................................................................15 minutes.   Additional History from reviewing old medical records or taking additional history from the family, EMS, PCP, etc.......................10 minutes.   Ordering, Reviewing, and Interpreting Diagnostic Studies...............................................................................................................5 minutes.   Documentation..................................................................................................................................................................................5 minutes.   Consultation with other Physicians. .................................................................................................................................................5 minutes.   Consultation with the patient's family directly relating to the patient's condition, care, and DNR status (when patient unable)......5 minutes.   ==============================================================  · Total Critical Care Time - exclusive of procedural time: 45 minutes.  ==============================================================  Critical care was necessary to treat or prevent imminent or life-threatening deterioration of the following conditions: airway compromise.       Attending ED Notes:   25-YEAR-OLD GENTLEMAN PRESENTING IN ACUTE RESPIRATORY DISTRESS SECONDARY TO ASTHMA.  PATIENT IS ON BIPAP.  Transitioned to our BiPAP  without behavioral continued.  Had received steroids and magnesium prior to arrival.  Patient denies any prior history of admission or intubation.    Patient received additional albuterol.  Was observed closely.  I have a low suspicion for an acute pneumothorax, infectious etiology or anaphylaxis.  However he does seem to poorly controlled with his asthma and given the severity of his presentation of  feeling needs to be admitted to the hospital for further management and intervention for long-term controller medications.            ED Course as of Oct 31 2324   Sat Oct 31, 2020   1756 Patient feels better than at arrival. Still with significant wheezing, better air movement in the lung bases on auscultation, O2 sat 100% on bipap    [OK]   1824 Continues to improve. Will transition off bipap/.    [HS]   2022 Patient with decreased air movement but no complaints, no outward respiratory distress, oxygen saturation 100%. Patient actively receiving breathing treatment    [OK]      ED Course User Index  [HS] Toñito Black MD  [OK] Brian Espinoza MD            Clinical Impression:     ICD-10-CM ICD-9-CM   1. Mild intermittent asthma with exacerbation  J45.21 493.92                      Disposition:   Disposition: Admitted  Condition: Serious     ED Disposition Condition    Admit                             Brian Espinoza MD  Resident  10/31/20 2136       Toñito Black MD  10/31/20 3601

## 2020-11-01 VITALS
RESPIRATION RATE: 18 BRPM | DIASTOLIC BLOOD PRESSURE: 59 MMHG | HEART RATE: 78 BPM | OXYGEN SATURATION: 92 % | TEMPERATURE: 98 F | SYSTOLIC BLOOD PRESSURE: 112 MMHG | WEIGHT: 165 LBS | BODY MASS INDEX: 23.62 KG/M2 | HEIGHT: 70 IN

## 2020-11-01 LAB
ANION GAP SERPL CALC-SCNC: 9 MMOL/L (ref 8–16)
BASOPHILS # BLD AUTO: 0.03 K/UL (ref 0–0.2)
BASOPHILS NFR BLD: 0.3 % (ref 0–1.9)
BUN SERPL-MCNC: 11 MG/DL (ref 6–20)
CALCIUM SERPL-MCNC: 8.5 MG/DL (ref 8.7–10.5)
CHLORIDE SERPL-SCNC: 106 MMOL/L (ref 95–110)
CO2 SERPL-SCNC: 22 MMOL/L (ref 23–29)
CREAT SERPL-MCNC: 0.9 MG/DL (ref 0.5–1.4)
DIFFERENTIAL METHOD: ABNORMAL
EOSINOPHIL # BLD AUTO: 0 K/UL (ref 0–0.5)
EOSINOPHIL NFR BLD: 0.1 % (ref 0–8)
ERYTHROCYTE [DISTWIDTH] IN BLOOD BY AUTOMATED COUNT: 13 % (ref 11.5–14.5)
EST. GFR  (AFRICAN AMERICAN): >60 ML/MIN/1.73 M^2
EST. GFR  (NON AFRICAN AMERICAN): >60 ML/MIN/1.73 M^2
GLUCOSE SERPL-MCNC: 114 MG/DL (ref 70–110)
HCT VFR BLD AUTO: 38.4 % (ref 40–54)
HGB BLD-MCNC: 12.9 G/DL (ref 14–18)
IMM GRANULOCYTES # BLD AUTO: 0.04 K/UL (ref 0–0.04)
IMM GRANULOCYTES NFR BLD AUTO: 0.4 % (ref 0–0.5)
LACTATE SERPL-SCNC: 0.9 MMOL/L (ref 0.5–2.2)
LACTATE SERPL-SCNC: 3.3 MMOL/L (ref 0.5–2.2)
LYMPHOCYTES # BLD AUTO: 1.5 K/UL (ref 1–4.8)
LYMPHOCYTES NFR BLD: 14 % (ref 18–48)
MAGNESIUM SERPL-MCNC: 1.7 MG/DL (ref 1.6–2.6)
MCH RBC QN AUTO: 30 PG (ref 27–31)
MCHC RBC AUTO-ENTMCNC: 33.6 G/DL (ref 32–36)
MCV RBC AUTO: 89 FL (ref 82–98)
MONOCYTES # BLD AUTO: 0.6 K/UL (ref 0.3–1)
MONOCYTES NFR BLD: 6 % (ref 4–15)
NEUTROPHILS # BLD AUTO: 8.4 K/UL (ref 1.8–7.7)
NEUTROPHILS NFR BLD: 79.2 % (ref 38–73)
NRBC BLD-RTO: 0 /100 WBC
PLATELET # BLD AUTO: 231 K/UL (ref 150–350)
PMV BLD AUTO: 12 FL (ref 9.2–12.9)
POTASSIUM SERPL-SCNC: 4.7 MMOL/L (ref 3.5–5.1)
RBC # BLD AUTO: 4.3 M/UL (ref 4.6–6.2)
SODIUM SERPL-SCNC: 137 MMOL/L (ref 136–145)
WBC # BLD AUTO: 10.57 K/UL (ref 3.9–12.7)

## 2020-11-01 PROCEDURE — 36415 COLL VENOUS BLD VENIPUNCTURE: CPT

## 2020-11-01 PROCEDURE — 27000221 HC OXYGEN, UP TO 24 HOURS

## 2020-11-01 PROCEDURE — 83735 ASSAY OF MAGNESIUM: CPT

## 2020-11-01 PROCEDURE — 85025 COMPLETE CBC W/AUTO DIFF WBC: CPT

## 2020-11-01 PROCEDURE — 96374 THER/PROPH/DIAG INJ IV PUSH: CPT

## 2020-11-01 PROCEDURE — 25000003 PHARM REV CODE 250: Performed by: STUDENT IN AN ORGANIZED HEALTH CARE EDUCATION/TRAINING PROGRAM

## 2020-11-01 PROCEDURE — 63600175 PHARM REV CODE 636 W HCPCS: Performed by: STUDENT IN AN ORGANIZED HEALTH CARE EDUCATION/TRAINING PROGRAM

## 2020-11-01 PROCEDURE — G0378 HOSPITAL OBSERVATION PER HR: HCPCS

## 2020-11-01 PROCEDURE — 94761 N-INVAS EAR/PLS OXIMETRY MLT: CPT

## 2020-11-01 PROCEDURE — 25000242 PHARM REV CODE 250 ALT 637 W/ HCPCS: Performed by: STUDENT IN AN ORGANIZED HEALTH CARE EDUCATION/TRAINING PROGRAM

## 2020-11-01 PROCEDURE — 94640 AIRWAY INHALATION TREATMENT: CPT

## 2020-11-01 PROCEDURE — 99239 PR HOSPITAL DISCHARGE DAY,>30 MIN: ICD-10-PCS | Mod: ,,, | Performed by: STUDENT IN AN ORGANIZED HEALTH CARE EDUCATION/TRAINING PROGRAM

## 2020-11-01 PROCEDURE — 99239 HOSP IP/OBS DSCHRG MGMT >30: CPT | Mod: ,,, | Performed by: STUDENT IN AN ORGANIZED HEALTH CARE EDUCATION/TRAINING PROGRAM

## 2020-11-01 PROCEDURE — 83605 ASSAY OF LACTIC ACID: CPT | Mod: 91

## 2020-11-01 PROCEDURE — 80048 BASIC METABOLIC PNL TOTAL CA: CPT

## 2020-11-01 RX ORDER — MAGNESIUM SULFATE HEPTAHYDRATE 40 MG/ML
2 INJECTION, SOLUTION INTRAVENOUS ONCE
Status: COMPLETED | OUTPATIENT
Start: 2020-11-01 | End: 2020-11-01

## 2020-11-01 RX ORDER — BUDESONIDE AND FORMOTEROL FUMARATE DIHYDRATE 160; 4.5 UG/1; UG/1
1 AEROSOL RESPIRATORY (INHALATION) DAILY
Qty: 10.2 G | Refills: 3 | Status: SHIPPED | OUTPATIENT
Start: 2020-11-01

## 2020-11-01 RX ORDER — PREDNISONE 20 MG/1
40 TABLET ORAL DAILY
Qty: 6 TABLET | Refills: 0 | Status: SHIPPED | OUTPATIENT
Start: 2020-11-01 | End: 2020-11-04

## 2020-11-01 RX ORDER — FLUTICASONE FUROATE AND VILANTEROL 100; 25 UG/1; UG/1
1 POWDER RESPIRATORY (INHALATION) DAILY
Qty: 1 EACH | Refills: 2 | Status: SHIPPED | OUTPATIENT
Start: 2020-11-01 | End: 2020-11-01

## 2020-11-01 RX ORDER — FLUTICASONE FUROATE AND VILANTEROL 100; 25 UG/1; UG/1
1 POWDER RESPIRATORY (INHALATION)
Qty: 60 EACH | Refills: 6 | Status: CANCELLED | OUTPATIENT
Start: 2020-11-01

## 2020-11-01 RX ORDER — PREDNISONE 20 MG/1
40 TABLET ORAL DAILY
Qty: 6 TABLET | Refills: 0 | Status: CANCELLED | OUTPATIENT
Start: 2020-11-01 | End: 2020-11-04

## 2020-11-01 RX ADMIN — FLUTICASONE FUROATE AND VILANTEROL TRIFENATATE 1 PUFF: 100; 25 POWDER RESPIRATORY (INHALATION) at 07:11

## 2020-11-01 RX ADMIN — SODIUM CHLORIDE 1000 ML: 0.9 INJECTION, SOLUTION INTRAVENOUS at 12:11

## 2020-11-01 RX ADMIN — POTASSIUM BICARBONATE 50 MEQ: 978 TABLET, EFFERVESCENT ORAL at 12:11

## 2020-11-01 RX ADMIN — ALBUTEROL SULFATE 2.5 MG: 2.5 SOLUTION RESPIRATORY (INHALATION) at 11:11

## 2020-11-01 RX ADMIN — MAGNESIUM SULFATE IN WATER 2 G: 40 INJECTION, SOLUTION INTRAVENOUS at 08:11

## 2020-11-01 RX ADMIN — ALBUTEROL SULFATE 2.5 MG: 2.5 SOLUTION RESPIRATORY (INHALATION) at 07:11

## 2020-11-01 NOTE — DISCHARGE SUMMARY
[facility] Hospital Medicine  Discharge Summary      Patient Name: Mona Whyte  MRN: [id number]  Admission Date: 10/31/2020  Hospital Length of Stay: 1 days  Discharge Date and Time:  11/01/2020 2:35 PM  Attending Physician: Behram Khan, MD   Discharging Provider: Shane Hyatt MD  Primary Care Provider: [facility] Medicine Team: [facility] HOSP MED 5 Shane Hyatt MD    HPI:   The pt is a 26 yo M with mild persistent asthma and a stutter that presents to [facility] via EMS for SOB. Per ED notes, the patient arrived via EMS on CPAP and was subsequently placed on BiPAP; was also treated with nebulizer treatments and 1 L NS with resolution of dyspnea. 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 There is much smoke and inhalational triggers at work as there is poor ventilation and his significant other states that there is a lot of steam and air that blows directly into his face. He does not wear a mask as he feels this makes it harder for him to breathe. He states that he has been using his albuterol rescue inhaler approximately 10x/week and chart review is notable for recent similar admission in 09/2020 during which he was treated with nebs and given a short 5 day course of steroids; reports that he greatly improved following that therapy. Was triggered by work at that time too. Most recent exacerbation prior to that was a year ago at a family cook out. Approximates 15 exacerbations total in life time. He states he has had asthma since childhood and did see a pulmonologist before, but is unsure of the name. No records of formal PFT's or pulmonology clinic visits in our EMR.     * No surgery found *      Hospital Course:   Admitted to  on 10/31 with acute asthma exacerbation/mild persistent asthma. Will treat with duonebs and prednisone 40 mg x 5  days. Given recurrent asthma exacerbation and frequent rescue inhaler use- will begin Breo-Ellipta. Resolution of symptoms on 11/1. Will discharge patient on symbicort and 5 day course of prednisone. Discussed with patient potential exposures from work place and recommend possible change of work.      Consults:      Physical Exam   Constitutional: He is oriented to person, place, and time and well-developed, well-nourished, and in no distress.   HENT:   Head: Normocephalic and atraumatic.   Eyes: Conjunctivae are normal. Right eye exhibits no discharge. Left eye exhibits no discharge. No scleral icterus.   Neck: Neck supple. No JVD present.   Cardiovascular: Normal rate and regular rhythm. Exam reveals no friction rub.   No murmur heard.  Pulmonary/Chest: Effort normal and breath sounds normal. No respiratory distress. He has no wheezes.   Abdominal: Bowel sounds are normal. He exhibits no distension. There is no abdominal tenderness.   Musculoskeletal: Normal range of motion.         General: No tenderness or edema.   Neurological: He is alert and oriented to person, place, and time. GCS score is 15.   Skin: No erythema.         * Mild persistent asthma with acute exacerbation  24 yo M with mild persistent asthma that presents to [facility] via EMS for SOB. Reports recent job change with trigger for asthma with recurrent exacerbation. Reports albuterol use >10/week though not everyday.     DDX includes but is not limited to: Asthma Exacerbation vs Bacterial PNA vs Viral PNA vs PE  Labs: Afebrile, tachycardic to 105, WBC 13.6 with left shift, K 3.5, Cr 1.0   Imaging: CXR clear   Pending: Lactic Acid   Clinical Reasoning: Given symptomatic improvement with inhaler and profound wheezing on exam s/p nebulizer; most likely etiology in this patient is asthma exacerbation given recurrent trigger at work. Though the patient has a leukocytosis and a left shift, he denies any fevers and chills or recent sick contacts. No history of  immobilization and though he has tachycardia (s/p albuterol), he does not have any clear risk factors for PE and Wells Score for PE is 1.5.    Plan:  - Discharge on symbicort and five day total course of po steroids (10/31-11/4)  - Discussed with patient the risk of his current employment to his health and recommended patient pursue alternate career paths.   - Follow up with PCP, pulmonology.       High anion gap metabolic acidosis  - Anion Gap elevated to 14  - Will check Lactic Acid; if elevated will check serial LA [id number]  - Trend daily    -Anion gap on 110/1- 10  Lactic Acid 0.9- resolved    Hypokalemia  - Replace as needed         Final Active Diagnoses:    Diagnosis Date Noted POA    PRINCIPAL PROBLEM:  Mild persistent asthma with acute exacerbation [J45.31] 10/31/2020 Unknown    Hypokalemia [E87.6] 10/31/2020 Unknown    High anion gap metabolic acidosis [E87.2] 10/31/2020 Unknown    Discharge planning issues [Z02.9] 10/31/2020 Not Applicable      Problems Resolved During this Admission:    Diagnosis Date Noted Date Resolved POA    Mild intermittent asthma with exacerbation [J45.21] 10/31/2020 10/31/2020 Yes       Discharged Condition: good    Disposition: Home or Self Care    Follow Up:    Patient Instructions:      Ambulatory referral/consult to Pulmonology   Standing Status: Future   Referral Priority: Routine Referral Type: Consultation   Referral Reason: Specialty Services Required   Requested Specialty: Pulmonary Disease   Number of Visits Requested: 1     Ambulatory referral/consult to Internal Medicine   Standing Status: Future   Referral Priority: Routine Referral Type: Consultation   Referral Reason: Specialty Services Required   Requested Specialty: Internal Medicine   Number of Visits Requested: 1       Significant Diagnostic Studies: Labs:   CMP   Recent Labs   Lab 10/31/20  1933 11/01/20  0459    137   K 3.3* 4.7   CL 99 106   CO2 23 22*    114*   BUN 13 11   CREATININE 1.0 0.9    CALCIUM 9.1 8.5*   PROT 7.4  --    ALBUMIN 4.3  --    BILITOT 0.7  --    ALKPHOS 50*  --    AST 19  --    ALT 13  --    ANIONGAP 14 9   ESTGFRAFRICA >60.0 >60.0   EGFRNONAA >60.0 >60.0    and CBC   Recent Labs   Lab 10/31/20  1933 11/01/20  0459   WBC 13.67* 10.57   HGB 13.8* 12.9*   HCT 42.0 38.4*    231     Radiology: X-Ray: CXR: X-Ray Chest 1 View (CXR): No results found for this visit on 10/31/20.    Pending Diagnostic Studies:     None         Medications:  Reconciled Home Medications:      Medication List      START taking these medications    predniSONE 20 MG tablet  Commonly known as: DELTASONE  Take 2 tablets (40 mg total) by mouth once daily. for 3 days     SYMBICORT 160-4.5 mcg/actuation Hfaa  Generic drug: budesonide-formoterol 160-4.5 mcg  Inhale 2 puffs into the lungs twice daily daily.        STOP taking these medications    albuterol 90 mcg/actuation inhaler  Commonly known as: PROVENTIL/VENTOLIN HFA            Indwelling Lines/Drains at time of discharge:   Lines/Drains/Airways     None                 Time spent on the discharge of patient: 45 minutes  Patient was seen and examined on the date of discharge and determined to be suitable for discharge.         Shane Hyatt MD  Department of Hospital Medicine  [facility]

## 2020-11-01 NOTE — HOSPITAL COURSE
Admitted to  on 10/31 with acute asthma exacerbation/mild persistent asthma. Will treat with duonebs and prednisone 40 mg x 5 days. Given recurrent asthma exacerbation and frequent rescue inhaler use- will begin Breo-Ellipta. Resolution of symptoms on 11/1. Will discharge patient on symbicort and 5 day course of prednisone. Discussed with patient potential exposures from work place and recommend possible change of work.

## 2020-11-01 NOTE — ASSESSMENT & PLAN NOTE
- Anion Gap elevated to 14  - Will check Lactic Acid; if elevated will check serial LA [id number]  - Trend daily    -Anion gap on 110/1- 10  Lactic Acid 0.9- resolved

## 2020-11-01 NOTE — SUBJECTIVE & OBJECTIVE
Past Medical History:   Diagnosis Date    Asthma     Mild Persistent    Stutter        Past Surgical History:   Procedure Laterality Date    MANDIBLE SURGERY         Review of patient's allergies indicates:   Allergen Reactions    Atrovent [ipratropium bromide]     Nuts [tree nut]     Shellfish containing products        No current facility-administered medications on file prior to encounter.      Current Outpatient Medications on File Prior to Encounter   Medication Sig    albuterol (PROVENTIL/VENTOLIN HFA) 90 mcg/actuation inhaler Inhale 1-2 puffs into the lungs every 6 (six) hours as needed for Wheezing. Rescue     Family History     Problem Relation (Age of Onset)    No Known Problems Mother, Father        Tobacco Use    Smoking status: Never Smoker   Substance and Sexual Activity    Alcohol use: Not on file    Drug use: Not on file    Sexual activity: Not on file     Review of Systems   Constitutional: Negative for chills, fatigue, fever and unexpected weight change.   HENT: Negative for congestion and sore throat.    Respiratory: Positive for cough (with sputum; chronic) and wheezing. Negative for chest tightness and shortness of breath (resolved).    Cardiovascular: Negative for chest pain, palpitations and leg swelling.   Gastrointestinal: Negative for abdominal distention, abdominal pain, diarrhea, nausea and vomiting.   Genitourinary: Negative for dysuria, frequency and urgency.   Musculoskeletal: Negative for arthralgias and myalgias.   Skin: Negative for color change, rash and wound.   Neurological: Positive for speech difficulty. Negative for dizziness and headaches.   Hematological: Negative for adenopathy.   Psychiatric/Behavioral: Negative for agitation and confusion.     Objective:     Vital Signs (Most Recent):  Temp: 98 °F (36.7 °C) (10/31/20 1906)  Pulse: 110 (10/31/20 2116)  Resp: 20 (10/31/20 2013)  BP: 128/68 (10/31/20 2116)  SpO2: 96 % (10/31/20 2116) Vital Signs (24h  Range):  Temp:  [98 °F (36.7 °C)] 98 °F (36.7 °C)  Pulse:  [] 110  Resp:  [20-34] 20  SpO2:  [95 %-100 %] 96 %  BP: (102-140)/(68-88) 128/68     Weight: 61.2 kg (135 lb)  Body mass index is 19.37 kg/m².    Physical Exam  Vitals signs and nursing note reviewed.   Constitutional:       Appearance: Normal appearance. He is normal weight. He is not ill-appearing, toxic-appearing or diaphoretic.   HENT:      Head: Normocephalic and atraumatic.      Mouth/Throat:      Mouth: Mucous membranes are moist.      Pharynx: Oropharynx is clear. No oropharyngeal exudate or posterior oropharyngeal erythema.   Eyes:      General: No scleral icterus.     Conjunctiva/sclera: Conjunctivae normal.      Pupils: Pupils are equal, round, and reactive to light.   Neck:      Musculoskeletal: Normal range of motion and neck supple.   Cardiovascular:      Rate and Rhythm: Regular rhythm. Tachycardia present.      Pulses: Normal pulses.           Radial pulses are 2+ on the right side and 2+ on the left side.   Pulmonary:      Effort: Pulmonary effort is normal. No respiratory distress.      Breath sounds: No stridor. Wheezing (most notably in the upper airways, but present diffusely) present. No rales.      Comments: On Room Air sating in the high 90's; able to speak in full sentences  No audible wheezing   Abdominal:      General: Abdomen is flat. Bowel sounds are normal. There is no distension.      Tenderness: There is no abdominal tenderness. There is no guarding.   Musculoskeletal: Normal range of motion.         General: No swelling, tenderness or deformity.      Right lower leg: No edema.      Left lower leg: No edema.   Lymphadenopathy:      Cervical: No cervical adenopathy.   Skin:     General: Skin is warm and dry.      Capillary Refill: Capillary refill takes less than 2 seconds.      Coloration: Skin is not jaundiced.      Findings: No erythema or rash.      Comments: No evidence of digital clubbing   Neurological:       Mental Status: He is alert and oriented to person, place, and time. Mental status is at baseline.   Psychiatric:         Mood and Affect: Mood normal.         Behavior: Behavior normal.           CRANIAL NERVES     CN III, IV, VI   Pupils are equal, round, and reactive to light.       Significant Labs: All pertinent labs within the past 24 hours have been reviewed.    Significant Imaging: I have reviewed and interpreted all pertinent imaging results/findings within the past 24 hours.

## 2020-11-01 NOTE — PLAN OF CARE
11/01/20 0809   Post-Acute Status   Post-Acute Authorization Other   Other Status No Post-Acute Service Needs     Pt will need follow up with Pulmonary and PCP at d/c, Pt has used [facility] to follow in am and asked team to place ambulatory order for Pulm consult and Pt will be ok to d/c once meds obtained.

## 2020-11-01 NOTE — ED NOTES
Admitted to floor. Diagnosis, medications, & POC discussed with patient. Patient verbalized understanding. All questions and concerns answered. No needs expressed at the time. Pt is awake, alert and oriented with no acute distress noted. Respirations even and unlabored. Per wheelchair out of ED to floor by RN on cardiac monitor. Respirations even and unlabored.

## 2020-11-01 NOTE — ASSESSMENT & PLAN NOTE
- Will need f/u with PCP and pulmonology; MARY 11/1  - Will need to price LABA/ICS with pharmacy to ensure patient can afford medication  - Patient will need counseling regarding career as this is triggering his asthma exacerbations

## 2020-11-01 NOTE — HPI
The pt is a 24 yo M with mild persistent asthma and a stutter that presents to [facility] via EMS for SOB. Per ED notes, the patient arrived via EMS on CPAP and was subsequently placed on BiPAP; was also treated with nebulizer treatments and 1 L NS with resolution of dyspnea. 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 There is much smoke and inhalational triggers at work as there is poor ventilation and his significant other states that there is a lot of steam and air that blows directly into his face. He does not wear a mask as he feels this makes it harder for him to breathe. He states that he has been using his albuterol rescue inhaler approximately 10x/week and chart review is notable for recent similar admission in 09/2020 during which he was treated with nebs and given a short 5 day course of steroids; reports that he greatly improved following that therapy. Was triggered by work at that time too. Most recent exacerbation prior to that was a year ago at a family cook out. Approximates 15 exacerbations total in life time. He states he has had asthma since childhood and did see a pulmonologist before, but is unsure of the name. No records of formal PFT's or pulmonology clinic visits in our EMR.

## 2020-11-01 NOTE — ED NOTES
Report received from Pancho RN. Care assumed. Pt resting comfortably and independently repositioned in stretcher with bed locked in lowest position for safety. NAD and patient denies SOB. Pt tachypneic at RR 22 breaths per minute and tachycardic at 112 BPM. MD aware of pt status. Airway open and patent, pt able to speak in complete sentences. Patient offered bathroom assistance and reports need to void. Pt provided with urinal. Pt remains on cardiac monitor, automated BP cuff, and pulse oximeter. Side rails raised x2, bed locked and low. IVF bolus infusing per MD order.

## 2020-11-01 NOTE — PLAN OF CARE
Problem: Adult Inpatient Plan of Care  Goal: Plan of Care Review  Outcome: Met  Goal: Patient-Specific Goal (Individualization)  Outcome: Met  Goal: Absence of Hospital-Acquired Illness or Injury  Outcome: Met  Goal: Optimal Comfort and Wellbeing  Outcome: Met  Goal: Readiness for Transition of Care  Outcome: Met  Goal: Rounds/Family Conference  Outcome: Met   Patient is discharged to home. Meds delivered to bedside. Awaiting family for pick-up.

## 2020-11-01 NOTE — H&P
[facility] Hospital Medicine  History & Physical    Patient Name: Mona Whyte  MRN: [id number]  Admission Date: 10/31/2020  Attending Physician: Behram Khan, MD   Primary Care Provider: Primary Doctor [facility] Medicine Team: [facility]OSP MED 5 Lolita Avalos DO     Patient information was obtained from patient, spouse/SO and ER records.     Subjective:     Principal Problem:Mild persistent asthma with acute exacerbation    Chief Complaint:   Chief Complaint   Patient presents with    Asthma        HPI: The pt is a 24 yo M with mild persistent asthma and a stutter that presents to [facility] via EMS for SOB. Per ED notes, the patient arrived via EMS on CPAP and was subsequently placed on BiPAP; was also treated with nebulizer treatments and 1 L NS with resolution of dyspnea. 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 There is much smoke and inhalational triggers at work as there is poor ventilation and his significant other states that there is a lot of steam and air that blows directly into his face. He does not wear a mask as he feels this makes it harder for him to breathe. He states that he has been using his albuterol rescue inhaler approximately 10x/week and chart review is notable for recent similar admission in 09/2020 during which he was treated with nebs and given a short 5 day course of steroids; reports that he greatly improved following that therapy. Was triggered by work at that time too. Most recent exacerbation prior to that was a year ago at a family cook out. Approximates 15 exacerbations total in life time. He states he has had asthma since childhood and did see a pulmonologist before, but is unsure of the name. No records of formal PFT's or pulmonology clinic visits in our EMR.     Past Medical History:   Diagnosis Date    Asthma      Mild Persistent    Stutter        Past Surgical History:   Procedure Laterality Date    MANDIBLE SURGERY         Review of patient's allergies indicates:   Allergen Reactions    Atrovent [ipratropium bromide]     Nuts [tree nut]     Shellfish containing products        No current facility-administered medications on file prior to encounter.      Current Outpatient Medications on File Prior to Encounter   Medication Sig    albuterol (PROVENTIL/VENTOLIN HFA) 90 mcg/actuation inhaler Inhale 1-2 puffs into the lungs every 6 (six) hours as needed for Wheezing. Rescue     Family History     Problem Relation (Age of Onset)    No Known Problems Mother, Father        Tobacco Use    Smoking status: Never Smoker   Substance and Sexual Activity    Alcohol use: Not on file    Drug use: Not on file    Sexual activity: Not on file     Review of Systems   Constitutional: Negative for chills, fatigue, fever and unexpected weight change.   HENT: Negative for congestion and sore throat.    Respiratory: Positive for cough (with sputum; chronic) and wheezing. Negative for chest tightness and shortness of breath (resolved).    Cardiovascular: Negative for chest pain, palpitations and leg swelling.   Gastrointestinal: Negative for abdominal distention, abdominal pain, diarrhea, nausea and vomiting.   Genitourinary: Negative for dysuria, frequency and urgency.   Musculoskeletal: Negative for arthralgias and myalgias.   Skin: Negative for color change, rash and wound.   Neurological: Positive for speech difficulty. Negative for dizziness and headaches.   Hematological: Negative for adenopathy.   Psychiatric/Behavioral: Negative for agitation and confusion.     Objective:     Vital Signs (Most Recent):  Temp: 98 °F (36.7 °C) (10/31/20 1906)  Pulse: 110 (10/31/20 2116)  Resp: 20 (10/31/20 2013)  BP: 128/68 (10/31/20 2116)  SpO2: 96 % (10/31/20 2116) Vital Signs (24h Range):  Temp:  [98 °F (36.7 °C)] 98 °F (36.7 °C)  Pulse:  []  110  Resp:  [20-34] 20  SpO2:  [95 %-100 %] 96 %  BP: (102-140)/(68-88) 128/68     Weight: 61.2 kg (135 lb)  Body mass index is 19.37 kg/m².    Physical Exam  Vitals signs and nursing note reviewed.   Constitutional:       Appearance: Normal appearance. He is normal weight. He is not ill-appearing, toxic-appearing or diaphoretic.   HENT:      Head: Normocephalic and atraumatic.      Mouth/Throat:      Mouth: Mucous membranes are moist.      Pharynx: Oropharynx is clear. No oropharyngeal exudate or posterior oropharyngeal erythema.   Eyes:      General: No scleral icterus.     Conjunctiva/sclera: Conjunctivae normal.      Pupils: Pupils are equal, round, and reactive to light.   Neck:      Musculoskeletal: Normal range of motion and neck supple.   Cardiovascular:      Rate and Rhythm: Regular rhythm. Tachycardia present.      Pulses: Normal pulses.           Radial pulses are 2+ on the right side and 2+ on the left side.   Pulmonary:      Effort: Pulmonary effort is normal. No respiratory distress.      Breath sounds: No stridor. Wheezing (most notably in the upper airways, but present diffusely) present. No rales.      Comments: On Room Air sating in the high 90's; able to speak in full sentences  No audible wheezing   Abdominal:      General: Abdomen is flat. Bowel sounds are normal. There is no distension.      Tenderness: There is no abdominal tenderness. There is no guarding.   Musculoskeletal: Normal range of motion.         General: No swelling, tenderness or deformity.      Right lower leg: No edema.      Left lower leg: No edema.   Lymphadenopathy:      Cervical: No cervical adenopathy.   Skin:     General: Skin is warm and dry.      Capillary Refill: Capillary refill takes less than 2 seconds.      Coloration: Skin is not jaundiced.      Findings: No erythema or rash.      Comments: No evidence of digital clubbing   Neurological:      Mental Status: He is alert and oriented to person, place, and time.  Mental status is at baseline.   Psychiatric:         Mood and Affect: Mood normal.         Behavior: Behavior normal.           CRANIAL NERVES     CN III, IV, VI   Pupils are equal, round, and reactive to light.       Significant Labs: All pertinent labs within the past 24 hours have been reviewed.    Significant Imaging: I have reviewed and interpreted all pertinent imaging results/findings within the past 24 hours.    Assessment/Plan:     * Mild persistent asthma with acute exacerbation  26 yo M with mild persistent asthma that presents to [facility] via EMS for SOB. Reports recent job change with trigger for asthma with recurrent exacerbation. Reports albuterol use >10/week though not everyday.     DDX includes but is not limited to: Asthma Exacerbation vs Bacterial PNA vs Viral PNA vs PE  Labs: Afebrile, tachycardic to 105, WBC 13.6 with left shift, K 3.5, Cr 1.0   Imaging: CXR clear   Pending: Lactic Acid   Clinical Reasoning: Given symptomatic improvement with inhaler and profound wheezing on exam s/p nebulizer; most likely etiology in this patient is asthma exacerbation given recurrent trigger at work. Though the patient has a leukocytosis and a left shift, he denies any fevers and chills or recent sick contacts. No history of immobilization and though he has tachycardia (s/p albuterol), he does not have any clear risk factors for PE and Wells Score for PE is 1.5.    Plan:  - Scheduled duo nebs q4hrs while awake  - Will begin Breo- Ellipta  - Begin prednisone 40 gm QD x 5 days  - Check respiratory sputum culture   - Check Lactic Acid       Discharge planning issues  - Will need f/u with PCP and pulmonology; MARY 11/1  - Will need to price LABA/ICS with pharmacy to ensure patient can afford medication  - Patient will need counseling regarding career as this is triggering his asthma exacerbations     High anion gap metabolic acidosis  - Anion Gap elevated to 14  - Will check Lactic Acid; if elevated will check serial  LA [id number]  - Trend daily    Hypokalemia  - Replace as needed         VTE Risk Mitigation (From admission, onward)         Ordered     Place sequential compression device  Until discontinued      10/31/20 2108     IP VTE LOW RISK PATIENT  Once      10/31/20 2108                 Lolita Avalos DO, MS  Internal Medicine PGY-2  Department of Hospital Medicine   [facility]

## 2020-11-01 NOTE — ASSESSMENT & PLAN NOTE
26 yo M with mild persistent asthma that presents to [facility] via EMS for SOB. Reports recent job change with trigger for asthma with recurrent exacerbation. Reports albuterol use >10/week though not everyday.     DDX includes but is not limited to: Asthma Exacerbation vs Bacterial PNA vs Viral PNA vs PE  Labs: Afebrile, tachycardic to 105, WBC 13.6 with left shift, K 3.5, Cr 1.0   Imaging: CXR clear   Pending: Lactic Acid   Clinical Reasoning: Given symptomatic improvement with inhaler and profound wheezing on exam s/p nebulizer; most likely etiology in this patient is asthma exacerbation given recurrent trigger at work. Though the patient has a leukocytosis and a left shift, he denies any fevers and chills or recent sick contacts. No history of immobilization and though he has tachycardia (s/p albuterol), he does not have any clear risk factors for PE and Wells Score for PE is 1.5.    Plan:  - Scheduled duo nebs q4hrs while awake  - Will begin Breo- Ellipta  - Begin prednisone 40 gm QD x 5 days  - Check respiratory sputum culture   - Check Lactic Acid

## 2020-11-01 NOTE — ASSESSMENT & PLAN NOTE
26 yo M with mild persistent asthma that presents to [facility] via EMS for SOB. Reports recent job change with trigger for asthma with recurrent exacerbation. Reports albuterol use >10/week though not everyday.     DDX includes but is not limited to: Asthma Exacerbation vs Bacterial PNA vs Viral PNA vs PE  Labs: Afebrile, tachycardic to 105, WBC 13.6 with left shift, K 3.5, Cr 1.0   Imaging: CXR clear   Pending: Lactic Acid   Clinical Reasoning: Given symptomatic improvement with inhaler and profound wheezing on exam s/p nebulizer; most likely etiology in this patient is asthma exacerbation given recurrent trigger at work. Though the patient has a leukocytosis and a left shift, he denies any fevers and chills or recent sick contacts. No history of immobilization and though he has tachycardia (s/p albuterol), he does not have any clear risk factors for PE and Wells Score for PE is 1.5.    Plan:  - Discharge on symbicort and five day total course of po steroids (10/31-11/4)  - Discussed with patient the risk of his current employment to his health and recommended patient pursue alternate career paths.

## 2020-11-01 NOTE — ASSESSMENT & PLAN NOTE
- Anion Gap elevated to 14  - Will check Lactic Acid; if elevated will check serial LA [id number]  - Trend daily

## 2020-11-01 NOTE — ED NOTES
Telemetry Verification   Patient placed on Telemetry Box  Verified with War Room  Box # [id number]   Monitor Tech    Rate Sinus Tachycardia   Rhythm 109

## 2020-11-03 ENCOUNTER — PATIENT OUTREACH (OUTPATIENT)
Dept: ADMINISTRATIVE | Facility: CLINIC | Age: 25
End: 2020-11-03

## 2020-11-03 NOTE — PROGRESS NOTES
C3 nurse attempted to contact patient. The following occurred:   C3 nurse attempted to contact Mona Whyte for a TCC post hospital discharge follow up call. The patient is unable to conduct the call @ this time. The patient requested a callback.    The patient does not have a scheduled HOSFU appointment within 7-14 days post hospital discharge date 11/1/20. No PCP listed

## 2021-02-17 ENCOUNTER — OCCUPATIONAL HEALTH (OUTPATIENT)
Dept: URGENT CARE | Facility: CLINIC | Age: 26
End: 2021-02-17

## 2021-02-17 DIAGNOSIS — Z02.1 PRE-EMPLOYMENT EXAMINATION: Primary | ICD-10-CM

## 2021-02-17 PROCEDURE — 99499 UNLISTED E&M SERVICE: CPT | Mod: S$GLB,,, | Performed by: NURSE PRACTITIONER

## 2021-02-17 PROCEDURE — 80305 DRUG TEST PRSMV DIR OPT OBS: CPT | Mod: S$GLB,,, | Performed by: NURSE PRACTITIONER

## 2021-02-17 PROCEDURE — 80305 OOH COLLECTION ONLY DRUG SCREEN: ICD-10-PCS | Mod: S$GLB,,, | Performed by: NURSE PRACTITIONER

## 2021-02-17 PROCEDURE — 97750 LIFT TEST: ICD-10-PCS | Mod: S$GLB,,, | Performed by: NURSE PRACTITIONER

## 2021-02-17 PROCEDURE — 97750 PHYSICAL PERFORMANCE TEST: CPT | Mod: S$GLB,,, | Performed by: NURSE PRACTITIONER

## 2021-02-17 PROCEDURE — 99499 PHYSICAL, BASIC COMPLEXITY: ICD-10-PCS | Mod: S$GLB,,, | Performed by: NURSE PRACTITIONER

## 2021-04-12 ENCOUNTER — OFFICE VISIT (OUTPATIENT)
Dept: URGENT CARE | Facility: CLINIC | Age: 26
End: 2021-04-12
Payer: MEDICAID

## 2021-04-12 VITALS
DIASTOLIC BLOOD PRESSURE: 68 MMHG | HEIGHT: 70 IN | SYSTOLIC BLOOD PRESSURE: 119 MMHG | BODY MASS INDEX: 23.62 KG/M2 | OXYGEN SATURATION: 97 % | TEMPERATURE: 97 F | HEART RATE: 60 BPM | WEIGHT: 165 LBS

## 2021-04-12 DIAGNOSIS — B02.8 HERPES ZOSTER WITH COMPLICATION: Primary | ICD-10-CM

## 2021-04-12 PROCEDURE — 99204 PR OFFICE/OUTPT VISIT, NEW, LEVL IV, 45-59 MIN: ICD-10-PCS | Mod: S$GLB,,, | Performed by: NURSE PRACTITIONER

## 2021-04-12 PROCEDURE — 99204 OFFICE O/P NEW MOD 45 MIN: CPT | Mod: S$GLB,,, | Performed by: NURSE PRACTITIONER

## 2021-04-12 RX ORDER — ALBUTEROL SULFATE 0.63 MG/3ML
0.63 SOLUTION RESPIRATORY (INHALATION) EVERY 6 HOURS PRN
COMMUNITY

## 2021-04-12 RX ORDER — FAMCICLOVIR 500 MG/1
500 TABLET ORAL 3 TIMES DAILY
Qty: 21 TABLET | Refills: 0 | Status: SHIPPED | OUTPATIENT
Start: 2021-04-12 | End: 2021-04-19

## 2021-04-15 ENCOUNTER — TELEPHONE (OUTPATIENT)
Dept: URGENT CARE | Facility: CLINIC | Age: 26
End: 2021-04-15

## 2021-05-27 ENCOUNTER — OFFICE VISIT (OUTPATIENT)
Dept: URGENT CARE | Facility: CLINIC | Age: 26
End: 2021-05-27
Payer: MEDICAID

## 2021-05-27 VITALS
TEMPERATURE: 98 F | DIASTOLIC BLOOD PRESSURE: 59 MMHG | BODY MASS INDEX: 23.62 KG/M2 | WEIGHT: 165 LBS | OXYGEN SATURATION: 96 % | HEART RATE: 79 BPM | HEIGHT: 70 IN | RESPIRATION RATE: 12 BRPM | SYSTOLIC BLOOD PRESSURE: 114 MMHG

## 2021-05-27 DIAGNOSIS — J06.9 URI WITH COUGH AND CONGESTION: Primary | ICD-10-CM

## 2021-05-27 DIAGNOSIS — R49.0 HOARSENESS OF VOICE: ICD-10-CM

## 2021-05-27 DIAGNOSIS — Z87.09 HISTORY OF ASTHMA: ICD-10-CM

## 2021-05-27 PROCEDURE — 99214 OFFICE O/P EST MOD 30 MIN: CPT | Mod: S$GLB,,, | Performed by: NURSE PRACTITIONER

## 2021-05-27 PROCEDURE — 99214 PR OFFICE/OUTPT VISIT, EST, LEVL IV, 30-39 MIN: ICD-10-PCS | Mod: S$GLB,,, | Performed by: NURSE PRACTITIONER

## 2021-05-27 RX ORDER — CETIRIZINE HYDROCHLORIDE 10 MG/1
10 TABLET ORAL DAILY
Qty: 30 TABLET | Refills: 0 | Status: SHIPPED | OUTPATIENT
Start: 2021-05-27 | End: 2021-06-26

## 2021-05-27 RX ORDER — ALBUTEROL SULFATE 90 UG/1
1-2 AEROSOL, METERED RESPIRATORY (INHALATION) EVERY 4 HOURS PRN
Qty: 18 G | Refills: 0 | Status: SHIPPED | OUTPATIENT
Start: 2021-05-27

## 2021-05-27 RX ORDER — FLUTICASONE PROPIONATE 50 MCG
2 SPRAY, SUSPENSION (ML) NASAL DAILY
Qty: 16 G | Refills: 0 | Status: SHIPPED | OUTPATIENT
Start: 2021-05-27 | End: 2021-06-26

## 2021-05-30 ENCOUNTER — TELEPHONE (OUTPATIENT)
Dept: URGENT CARE | Facility: CLINIC | Age: 26
End: 2021-05-30

## 2024-01-23 ENCOUNTER — HOSPITAL ENCOUNTER (EMERGENCY)
Age: 29
Discharge: HOME | End: 2024-01-23
Attending: EMERGENCY MEDICINE

## 2024-01-23 ENCOUNTER — APPOINTMENT (OUTPATIENT)
Dept: GENERAL RADIOLOGY | Age: 29
End: 2024-01-23

## 2024-01-23 VITALS
RESPIRATION RATE: 19 BRPM | HEART RATE: 107 BPM | OXYGEN SATURATION: 98 % | WEIGHT: 160 LBS | TEMPERATURE: 98.5 F | SYSTOLIC BLOOD PRESSURE: 122 MMHG | DIASTOLIC BLOOD PRESSURE: 109 MMHG

## 2024-01-23 DIAGNOSIS — J45.901 ACUTE SEVERE EXACERBATION OF ASTHMA: Primary | ICD-10-CM

## 2024-01-23 LAB
FLUAV RNA SPEC QL NAA+PROBE: NOT DETECTED
FLUBV RNA SPEC QL NAA+PROBE: NOT DETECTED
SARS-COV-2 RDRP RESP QL NAA+PROBE: NOT DETECTED
SOURCE: NORMAL

## 2024-01-23 PROCEDURE — 87635 SARS-COV-2 COVID-19 AMP PRB: CPT

## 2024-01-23 PROCEDURE — 99284 EMERGENCY DEPT VISIT MOD MDM: CPT

## 2024-01-23 PROCEDURE — 87502 INFLUENZA DNA AMP PROBE: CPT

## 2024-01-23 PROCEDURE — 96375 TX/PRO/DX INJ NEW DRUG ADDON: CPT

## 2024-01-23 PROCEDURE — 2580000003 HC RX 258: Performed by: EMERGENCY MEDICINE

## 2024-01-23 PROCEDURE — 71045 X-RAY EXAM CHEST 1 VIEW: CPT

## 2024-01-23 PROCEDURE — 96365 THER/PROPH/DIAG IV INF INIT: CPT

## 2024-01-23 PROCEDURE — 6360000002 HC RX W HCPCS: Performed by: EMERGENCY MEDICINE

## 2024-01-23 PROCEDURE — 6370000000 HC RX 637 (ALT 250 FOR IP): Performed by: EMERGENCY MEDICINE

## 2024-01-23 RX ORDER — ALBUTEROL SULFATE 90 UG/1
2 AEROSOL, METERED RESPIRATORY (INHALATION) 4 TIMES DAILY PRN
Qty: 54 G | Refills: 1 | Status: SHIPPED | OUTPATIENT
Start: 2024-01-23

## 2024-01-23 RX ORDER — PREDNISONE 50 MG/1
50 TABLET ORAL DAILY
Qty: 4 TABLET | Refills: 0 | Status: SHIPPED | OUTPATIENT
Start: 2024-01-24 | End: 2024-01-28

## 2024-01-23 RX ORDER — IPRATROPIUM BROMIDE AND ALBUTEROL SULFATE 2.5; .5 MG/3ML; MG/3ML
1 SOLUTION RESPIRATORY (INHALATION)
Status: COMPLETED | OUTPATIENT
Start: 2024-01-23 | End: 2024-01-23

## 2024-01-23 RX ORDER — MAGNESIUM SULFATE IN WATER 40 MG/ML
2000 INJECTION, SOLUTION INTRAVENOUS
Status: COMPLETED | OUTPATIENT
Start: 2024-01-23 | End: 2024-01-23

## 2024-01-23 RX ADMIN — MAGNESIUM SULFATE HEPTAHYDRATE 2000 MG: 40 INJECTION, SOLUTION INTRAVENOUS at 20:09

## 2024-01-23 RX ADMIN — WATER 125 MG: 1 INJECTION INTRAMUSCULAR; INTRAVENOUS; SUBCUTANEOUS at 19:27

## 2024-01-23 RX ADMIN — IPRATROPIUM BROMIDE AND ALBUTEROL SULFATE 1 DOSE: 2.5; .5 SOLUTION RESPIRATORY (INHALATION) at 19:17

## 2024-01-23 RX ADMIN — IPRATROPIUM BROMIDE AND ALBUTEROL SULFATE 1 DOSE: 2.5; .5 SOLUTION RESPIRATORY (INHALATION) at 19:49

## 2024-01-23 ASSESSMENT — PAIN SCALES - GENERAL: PAINLEVEL_OUTOF10: 7

## 2024-01-23 ASSESSMENT — ENCOUNTER SYMPTOMS
SHORTNESS OF BREATH: 1
GASTROINTESTINAL NEGATIVE: 1
COUGH: 1
CHEST TIGHTNESS: 1
BACK PAIN: 0

## 2024-01-23 ASSESSMENT — LIFESTYLE VARIABLES
HOW MANY STANDARD DRINKS CONTAINING ALCOHOL DO YOU HAVE ON A TYPICAL DAY: PATIENT DOES NOT DRINK
HOW OFTEN DO YOU HAVE A DRINK CONTAINING ALCOHOL: NEVER

## 2024-01-23 ASSESSMENT — PAIN - FUNCTIONAL ASSESSMENT: PAIN_FUNCTIONAL_ASSESSMENT: 0-10

## 2024-01-24 NOTE — ED TRIAGE NOTES
Pt began feeling like he was having an asthma attack last night. Pt has been using albuterol inhaler without relief. Speaking in short phrases.

## 2024-01-24 NOTE — ED NOTES
I have reviewed discharge instructions with the patient and spouse.  The patient and spouse verbalized understanding.    Patient left ED via Discharge Method: ambulatory to Home with spouse.    Opportunity for questions and clarification provided.       Patient given 0 scripts.         To continue your aftercare when you leave the hospital, you may receive an automated call from our care team to check in on how you are doing.  This is a free service and part of our promise to provide the best care and service to meet your aftercare needs.” If you have questions, or wish to unsubscribe from this service please call [phone].  Thank you for Choosing our [facility] Emergency Department.

## 2024-01-24 NOTE — ED PROVIDER NOTES
Emergency Department Provider Note       PCP: None, None   Age: 28 y.o.   Sex: male     DISPOSITION Decision To Discharge 01/23/2024 09:05:15 PM       ICD-10-CM    1. Acute severe exacerbation of asthma  J45.901           Medical Decision Making     Complexity of Problems Addressed:  1 or more chronic illnesses with a severe exacerbation or progression.    Data Reviewed and Analyzed:  I independently ordered and reviewed each unique test.  I reviewed external records: ED visit note from an outside group.       I interpreted the X-rays chest x-ray unremarkable.  I interpreted the labs.    Discussion of management or test interpretation.  Patient was immediately brought back.  DuoNeb started.  Concern for asthma exacerbation.  Chest x-ray obtained.  No obvious pneumothorax pneumonia or infiltrate.  No signs of DVT.  No improvement after the first.  Will give second DuoNeb.  Will also give Solu-Medrol, 2 g of magnesium sulfate IV and reassess    9:31 PM  Received magnesium sulfate.  He feels significantly better.  After 30 minutes stable improved.  Repeat evaluation lungs are clear with no wheezing.  Normal air movement at this time.  He does have a stutter to his voice that is baseline.  Stated he is not feeling short of breath at all at this time.  Will discharge him home.  Given prednisone for 4 additional days starting tomorrow albuterol inhaler with 1 prescription and refill as well as a spacer.  Recommend follow-up and establish care with a local PCP.  Return precautions given.  No further questions or concerns.      Risk of Complications and/or Morbidity of Patient Management:  Prescription drug management performed.  Shared medical decision making was utilized in creating the patients health plan today.    ED Course as of 01/23/24 2131 Tue Jan 23, 2024 1944 Patient still tight however improved air movement.  Still has some wheezing.  Stated he is not feeling significantly better.  Will give another dose

## 2024-01-24 NOTE — DISCHARGE INSTRUCTIONS
4 additional day of prednisone starting tomorrow.  Use albuterol.  Use your spacer.  Establish care with the free clinic.  Return for any emergency

## 2024-01-24 NOTE — ED NOTES
Patient is on cardiac monitor, continuous pulse oximetry, and cycling BP. Patient is resting in stretcher. Respirations are even and unlabored. Side rails x 2. Call light within reach. Area Clear of clutter.

## 2024-04-14 PROBLEM — J45.909 ASTHMA: Status: ACTIVE | Noted: 2024-04-14

## 2024-05-04 ENCOUNTER — HOSPITAL ENCOUNTER (EMERGENCY)
Facility: HOSPITAL | Age: 29
Discharge: HOME | End: 2024-05-04
Attending: STUDENT IN AN ORGANIZED HEALTH CARE EDUCATION/TRAINING PROGRAM
Payer: MEDICAID

## 2024-05-04 VITALS
WEIGHT: 160 LBS | HEART RATE: 73 BPM | TEMPERATURE: 98 F | HEIGHT: 70 IN | DIASTOLIC BLOOD PRESSURE: 79 MMHG | RESPIRATION RATE: 16 BRPM | OXYGEN SATURATION: 98 % | BODY MASS INDEX: 22.9 KG/M2 | SYSTOLIC BLOOD PRESSURE: 125 MMHG

## 2024-05-04 DIAGNOSIS — J06.9 URI (UPPER RESPIRATORY INFECTION): ICD-10-CM

## 2024-05-04 DIAGNOSIS — J06.9 VIRAL URI WITH COUGH: Primary | ICD-10-CM

## 2024-05-04 PROCEDURE — 99284 EMERGENCY DEPT VISIT MOD MDM: CPT | Mod: 25

## 2024-05-04 PROCEDURE — 63600175 PHARM REV CODE 636 W HCPCS: Performed by: STUDENT IN AN ORGANIZED HEALTH CARE EDUCATION/TRAINING PROGRAM

## 2024-05-04 PROCEDURE — 0241U SARS-COV2 (COVID) WITH FLU/RSV BY PCR: CPT | Performed by: STUDENT IN AN ORGANIZED HEALTH CARE EDUCATION/TRAINING PROGRAM

## 2024-05-04 PROCEDURE — 96374 THER/PROPH/DIAG INJ IV PUSH: CPT

## 2024-05-04 RX ORDER — FLUTICASONE FUROATE AND VILANTEROL 100; 25 UG/1; UG/1
1 POWDER RESPIRATORY (INHALATION) DAILY
COMMUNITY

## 2024-05-04 RX ORDER — DEXAMETHASONE SODIUM PHOSPHATE 4 MG/ML
10 INJECTION, SOLUTION INTRA-ARTICULAR; INTRALESIONAL; INTRAMUSCULAR; INTRAVENOUS; SOFT TISSUE
Status: COMPLETED | OUTPATIENT
Start: 2024-05-04 | End: 2024-05-04

## 2024-05-04 RX ORDER — FLUTICASONE PROPIONATE 50 MCG
1 SPRAY, SUSPENSION (ML) NASAL 2 TIMES DAILY PRN
Qty: 15 G | Refills: 0 | Status: SHIPPED | OUTPATIENT
Start: 2024-05-04

## 2024-05-04 RX ADMIN — DEXAMETHASONE SODIUM PHOSPHATE 10 MG: 4 INJECTION INTRA-ARTICULAR; INTRALESIONAL; INTRAMUSCULAR; INTRAVENOUS; SOFT TISSUE at 10:05

## 2024-05-04 NOTE — Clinical Note
"Mona Baker" Pato was seen and treated in our emergency department on 5/4/2024.  He may return to work on 05/06/2024.       If you have any questions or concerns, please don't hesitate to call.      Adam Heredia, DO"

## 2024-05-05 LAB
INFLUENZA A, MOLECULAR: NOT DETECTED
INFLUENZA B, MOLECULAR: NOT DETECTED
RSV AG BY MOLECULAR METHOD: NOT DETECTED
SARS-COV-2 RNA RESP QL NAA+PROBE: NOT DETECTED

## 2024-05-05 NOTE — DISCHARGE INSTRUCTIONS
You were evaluated in the emergency department today for a viral upper respiratory infection.  Although there were no findings of concern to necessitate admission to the hospital or warrant immediate surgical intervention, disease exists on a spectrum and your disease process may progress.  If this is the case, please watch your symptoms and return to the emergency department if you feel worse and are unable to discuss care with your primary care doctor in follow up in the next several days.  Specific information regarding your complaint has been provided.  Thank you for choosing [facility]!    Rest, drink lots of fluids. Take Tylenol or motrin for body aches. If your cough continues to worsen or you have trouble breathing, or have a racing heart beat, dizziness, chest pains or feel as though you may pass out - please return to the ED. Please make an appointment to see your doctor for a check-up in several days. You may always return to the ED if you feel ill or have concerns.     You can take fluticasone nasal spray for your sinuses.  Takes Zyrtec daily.  You can also use saline nasal spray for rinses.    For throat pain, you can use over-the-counter lozenges.  The steroid you were given today may also help with your pain over the next couple of days.  Take albuterol as needed for your wheezing.

## 2024-05-05 NOTE — ED TRIAGE NOTES
Mona Whyte, a 29 y.o. male presents to the ED w/ complaint of productive cough (yellow), body aches, headache, and nasal congestion since Thursday. States daughter is sick. HX: asthma.    Triage note:  Chief Complaint   Patient presents with    Cough     Pt complaining of cough, body aches, headache, nasal congestion since Thursday      Review of patient's allergies indicates:   Allergen Reactions    Penicillins Swelling    Atrovent [ipratropium bromide]     Eggs [egg derived]     Fish containing products     Milk containing products (dairy)     Nuts [tree nut]     Shellfish containing products     Wheat containing prod      Past Medical History:   Diagnosis Date    Asthma     Mild Persistent    Stutter      Patient identifiers verified and correct for   LOC: The patient is awake, alert and aware of environment with an appropriate affect, the patient is oriented x 3 and speaking appropriately.   APPEARANCE: Patient appears comfortable and in no acute distress, patient is clean and well groomed.  SKIN: The skin is warm and dry, color consistent with ethnicity, patient has normal skin turgor and moist mucus membranes, skin intact, no breakdown or bruising noted.   MUSCULOSKELETAL: Patient moving all extremities spontaneously, no swelling noted.  EENT: productive cough, nasal congestion, and headache.  RESPIRATORY: Airway is open and patent, respirations are spontaneous, patient has a normal effort and rate, no accessory muscle use noted, CARDIAC: VSS.  GASTRO:  Pt states bowel movements have been regular.  : Pt denies any pain or frequency with urination.  NEURO: Pt opens eyes spontaneously, behavior appropriate to situation, follows commands, facial expression symmetrical.

## 2024-05-05 NOTE — ED PROVIDER NOTES
Source of History  patient and EMR    Chief Complaint    Cough (Pt complaining of cough, body aches, headache, nasal congestion since Thursday )      History of Present Illness    Mona Whyte is a 29 y.o. male presenting with body aches, cough, nasal congestion, for about 3 days.  History of asthma.  Has been taking albuterol pump about 4 times today.  Also takes Breo.  No fever.  Known sick contacts from family/friends.  History of asthma, stutter.  Otherwise well    Review of Systems    As per HPI and below:  Constitutional symptoms:  No chills, no sweats, no fever, no weakness  Skin symptoms:  No rash    Eye symptoms:  No blurred vision, no drainage  ENMT symptoms:  Odynophagia, nasal congestion, clear rhinorrhea.  No otalgia.  Respiratory symptoms:  + shortness of breath, + cough, + wheezing, no orthopnea, no dyspnea with exertion  Cardiovascular symptoms:  No chest pain, no leg swelling  Gastrointestinal symptoms:  No abdominal pain, no nausea, no vomiting  Musculoskeletal symptoms:  No back pain, No joint pains or aches    Neurologic symptoms:  No headache  Endocrine symptoms:  None except as in HPI  Hematologic symptoms:  None except as in HPI  Psychiatric symptoms:  No substance abuse      Past History    As per HPI and below:  Past Medical History:   Diagnosis Date    Asthma     Mild Persistent    Stutter        Past Surgical History:   Procedure Laterality Date    MANDIBLE SURGERY         Social History     Socioeconomic History    Marital status: Single   Tobacco Use    Smoking status: Never    Smokeless tobacco: Never   Substance and Sexual Activity    Alcohol use: Not Currently    Drug use: Not Currently       Family History   Problem Relation Name Age of Onset    No Known Problems Mother      No Known Problems Father         Review of patient's allergies indicates:   Allergen Reactions    Penicillins Swelling    Atrovent [ipratropium bromide]     Eggs [egg derived]     Fish containing products      "Milk containing products (dairy)     Nuts [tree nut]     Shellfish containing products     Wheat containing prod        No current facility-administered medications on file prior to encounter.     Current Outpatient Medications on File Prior to Encounter   Medication Sig Dispense Refill    budesonide-formoterol 160-4.5 mcg (SYMBICORT) 160-4.5 mcg/actuation HFAA Inhale 2 puffs into the lungs twice daily daily. 10.2 g 1    fluticasone furoate-vilanteroL (BREO ELLIPTA) 100-25 mcg/dose diskus inhaler Inhale 1 puff into the lungs once daily. Controller      albuterol (ACCUNEB) 0.63 mg/3 mL Nebu Take 3 mLs (0.63 mg total) by nebulization every 6 (six) hours as needed (wheezing). Rescue 75 mL 0    albuterol (VENTOLIN HFA) 90 mcg/actuation inhaler Inhale 2 puffs into the lungs every 6 (six) hours as needed for Wheezing. Rescue 18 g 2    cetirizine (ZYRTEC) 10 MG tablet Take 1 tablet (10 mg total) by mouth once daily. (Patient taking differently: Take 10 mg by mouth once daily. prn) 30 tablet 0       Physical Exam    Reviewed nursing notes.  Vitals:    05/04/24 2122 05/04/24 2347   BP: 127/81 125/79   Pulse: 74 73   Resp: 18 16   Temp: 98.4 °F (36.9 °C) 98.3 °F (36.8 °C)   SpO2: 97% 98%   Weight: 72.6 kg (160 lb)    Height: 5' 10" (1.778 m)      General:  Alert, no acute distress.    Skin:  Warm, dry, intact.  No rash.  Head:  Normocephalic, atraumatic.    Neck:  Supple.   HEENT:  Pupils are equal and round, appropriate for room, extraocular movements are intact.  Normal phonation.  Moist mucous membranes. Nasal congestion bilateral nares. Oropharynx erythematous. No trismus. No uvular edema. No tonsillar edema.  Cardiovascular:  Regular rate and rhythm, Normal peripheral perfusion, No edema.    Respiratory:  Lungs are clear to auscultation, respirations are non-labored, breath sounds are equal.    Gastrointestinal:  Soft, Nontender, Non distended.   Back:  Nontender. Normal gait.  Ambulatory.  Musculoskeletal:  Normal range " of motion observed.  Neurological:  Alert and oriented to person, place, time, and situation.  No focal deficits observed.   Psychiatric:  Cooperative, appropriate mood & affect.       Initial MDM and Data Review    29 y.o. male presenting for evaluation of cough/wheezing/nasal congestion, hx asthma. Vitals wnl. Afebrile and well appearing, no resp distress    Differential includes but is not limited to: viral syndrome, uri, pneumonia, asthma    Work-up includes: viral swab, cxr    Interventions include: no wheezing - hold albuterol  Symptomatic and supportive care  Decadron - for both asthma, viral pharyngitis    The patient has significant medical comorbidities that influence decision making for this acute process, such as: asthma    I decided to obtain the patient's medical records and review relevant documentation from hospital records.  Pertinent information is noted.      Medications   dexAMETHasone injection 10 mg (10 mg Intravenous Given 5/4/24 2234)       Results and ED Course    Labs Reviewed   SARS-COV2 (COVID) WITH FLU/RSV BY PCR       Imaging Results              X-Ray Chest PA And Lateral (Final result)  Result time 05/04/24 23:12:19      Final result by Jayro Leong MD (05/04/24 23:12:19)                   Impression:      No consolidation or other acute radiographic abnormality in the visualized chest.      Electronically signed by: Jayro Leong  Date:    05/04/2024  Time:    23:12               Narrative:    EXAMINATION:  XR CHEST PA AND LATERAL    CLINICAL HISTORY:  Acute upper respiratory infection, unspecified    TECHNIQUE:  PA and lateral views of the chest were performed.    COMPARISON:  PA and lateral chest x-ray    FINDINGS:  Midline thoracic trachea.    The cardiomediastinal silhouette, lungs, pleura, visualized skeleton, and visualized upper abdomen demonstrate no acute radiographic abnormalities.                                      ED Course as of 05/05/24 1653   Sat May 04, 2024    2331 Impression:     No consolidation or other acute radiographic abnormality in the visualized chest.        Electronically signed by:Jayro Leong  Date:                                            05/04/2024  Time:                                           23:12   [AC]   2331 No pneumonia on chest x-ray [AC]      ED Course User Index  [AC] Adam Heredia, DO       Relevant imaging interpreted by myself  No consolidation, no ptx    Impression and Plan    29 y.o. male with findings of URI based on the work up in the emergency department as above.    Important lab/imaging findings include: reviewed as above  Pending viral panel    All tests, treatment options and disposition options were discussed with the patient.  The decision was made to discharge the patient to home.    The patient was discharged in stable condition and all further questions/concerns by patient and/or family were addressed.    The patient will follow up with their primary care physician as discussed in the next several days or return if any further concerns or change in symptoms necessitating re-evaluation.           Final diagnoses:  [J06.9] URI (upper respiratory infection)  [J06.9] Viral URI with cough (Primary)        ED Disposition Condition    Discharge Stable          ED Prescriptions       Medication Sig Dispense Start Date End Date Auth. Provider    fluticasone propionate (FLONASE) 50 mcg/actuation nasal spray 1 spray (50 mcg total) by Each Nostril route 2 (two) times daily as needed for Rhinitis. 15 g 5/4/2024 -- Adam Heredia, DO          Follow-up Information       Follow up With Specialties Details Why Contact Info        Follow up with your primary care doctor for re-evaluation               Adam Heredia,   05/05/24 5267

## 2024-05-05 NOTE — FIRST PROVIDER EVALUATION
"Medical screening examination initiated.  I have conducted a focused provider triage encounter, findings are as follows:    Brief history of present illness:  30yo M hx asthma presenting with , URI x2d, endorses HA, hoarse voice, congestion, cough, no fevers. Using inhaler 4x/day. No SOB.     Vitals:    05/04/24 2122   BP: 127/81   Pulse: 74   Resp: 18   Temp: 98.4 °F (36.9 °C)   SpO2: 97%   Weight: 72.6 kg (160 lb)   Height: 5' 10" (1.778 m)       Pertinent physical exam:  hoarse voice, no respiratory distress    Preliminary workup initiated; this workup will be continued and followed by the physician or advanced practice provider that is assigned to the patient when roomed.  "

## 2024-08-09 DIAGNOSIS — Z84.81 FAMILY HISTORY OF GENETIC DISEASE CARRIER: Primary | ICD-10-CM

## 2024-11-27 ENCOUNTER — PATIENT MESSAGE (OUTPATIENT)
Dept: RESEARCH | Facility: HOSPITAL | Age: 29
End: 2024-11-27
Payer: MEDICAID

## 2025-01-12 PROBLEM — M79.641 HAND PAIN, RIGHT: Status: ACTIVE | Noted: 2025-01-12

## 2025-01-12 PROBLEM — B02.9 HERPES ZOSTER WITHOUT COMPLICATION: Status: ACTIVE | Noted: 2025-01-12

## 2025-01-29 ENCOUNTER — PATIENT OUTREACH (OUTPATIENT)
Facility: OTHER | Age: 30
End: 2025-01-29
Payer: MEDICAID